# Patient Record
Sex: MALE | Race: BLACK OR AFRICAN AMERICAN | Employment: OTHER | URBAN - METROPOLITAN AREA
[De-identification: names, ages, dates, MRNs, and addresses within clinical notes are randomized per-mention and may not be internally consistent; named-entity substitution may affect disease eponyms.]

---

## 2023-06-27 ENCOUNTER — TELEPHONE (OUTPATIENT)
Dept: INTERNAL MEDICINE | Age: 88
End: 2023-06-27

## 2023-07-25 ENCOUNTER — OFFICE VISIT (OUTPATIENT)
Dept: INTERNAL MEDICINE | Age: 88
End: 2023-07-25
Payer: MEDICARE

## 2023-07-25 VITALS
DIASTOLIC BLOOD PRESSURE: 67 MMHG | SYSTOLIC BLOOD PRESSURE: 173 MMHG | OXYGEN SATURATION: 95 % | TEMPERATURE: 98.6 F | WEIGHT: 175.4 LBS | HEART RATE: 58 BPM

## 2023-07-25 DIAGNOSIS — R60.0 BILATERAL LEG EDEMA: Primary | ICD-10-CM

## 2023-07-25 DIAGNOSIS — I10 PRIMARY HYPERTENSION: ICD-10-CM

## 2023-07-25 PROBLEM — N40.0 BPH (BENIGN PROSTATIC HYPERPLASIA): Status: ACTIVE | Noted: 2023-07-25

## 2023-07-25 PROBLEM — I65.23 BILATERAL CAROTID ARTERY STENOSIS: Status: ACTIVE | Noted: 2023-07-25

## 2023-07-25 PROCEDURE — 99204 OFFICE O/P NEW MOD 45 MIN: CPT

## 2023-07-25 PROCEDURE — 4004F PT TOBACCO SCREEN RCVD TLK: CPT

## 2023-07-25 PROCEDURE — G8421 BMI NOT CALCULATED: HCPCS

## 2023-07-25 PROCEDURE — 1123F ACP DISCUSS/DSCN MKR DOCD: CPT

## 2023-07-25 PROCEDURE — G8428 CUR MEDS NOT DOCUMENT: HCPCS

## 2023-07-25 RX ORDER — FUROSEMIDE 40 MG/1
40 TABLET ORAL DAILY
Qty: 60 TABLET | Refills: 0 | Status: SHIPPED | OUTPATIENT
Start: 2023-07-25

## 2023-07-25 SDOH — ECONOMIC STABILITY: HOUSING INSECURITY
IN THE LAST 12 MONTHS, WAS THERE A TIME WHEN YOU DID NOT HAVE A STEADY PLACE TO SLEEP OR SLEPT IN A SHELTER (INCLUDING NOW)?: NO

## 2023-07-25 SDOH — ECONOMIC STABILITY: FOOD INSECURITY: WITHIN THE PAST 12 MONTHS, YOU WORRIED THAT YOUR FOOD WOULD RUN OUT BEFORE YOU GOT MONEY TO BUY MORE.: NEVER TRUE

## 2023-07-25 SDOH — ECONOMIC STABILITY: FOOD INSECURITY: WITHIN THE PAST 12 MONTHS, THE FOOD YOU BOUGHT JUST DIDN'T LAST AND YOU DIDN'T HAVE MONEY TO GET MORE.: NEVER TRUE

## 2023-07-25 SDOH — ECONOMIC STABILITY: INCOME INSECURITY: HOW HARD IS IT FOR YOU TO PAY FOR THE VERY BASICS LIKE FOOD, HOUSING, MEDICAL CARE, AND HEATING?: NOT HARD AT ALL

## 2023-07-25 ASSESSMENT — PATIENT HEALTH QUESTIONNAIRE - PHQ9
2. FEELING DOWN, DEPRESSED OR HOPELESS: 0
1. LITTLE INTEREST OR PLEASURE IN DOING THINGS: 0
SUM OF ALL RESPONSES TO PHQ QUESTIONS 1-9: 0
SUM OF ALL RESPONSES TO PHQ9 QUESTIONS 1 & 2: 0

## 2023-07-25 ASSESSMENT — ENCOUNTER SYMPTOMS
CONSTIPATION: 0
SHORTNESS OF BREATH: 0
NAUSEA: 0
ABDOMINAL PAIN: 0
VOMITING: 0

## 2023-07-25 NOTE — PROGRESS NOTES
[facility] PHYSICIANS  [facility]  [address]   [address] [address]  Dept: [phone]  Dept Fax: [phone]    Office Progress/Follow Up Note  Date ofpatient's visit: 7/25/2023  Patient's Name:  Judy Membreno YOB: 1932            Patient Care Team:  Chino Almodovar MD as PCP - General (Internal Medicine)  ================================================================    REASON FOR VISIT/CHIEF COMPLAINT:  FOLLOW-UP VISIT; No chief complaint on file. HISTORY OF PRESENTING ILLNESS:  History was obtained from: patient, electronic medical record. Rachel Catherine a 80 y.o. is here for a new patient to establish care. Past medical history of hypertension, BPH, glaucoma, b/l carotid endarterectomy and peripheral vascular disease    As per records he takes aspirin, hydrocholorothiazide, tamsulosin, losartan, hydralazine, hydralazine, metoprolol and furosemide. He reports that he only takes 2 blood pressure medications at home, 1 of which is hydrochlorothiazide 25 daily in the morning and he cannot remember the medication he takes for blood pressure. He also reports that he takes Lasix because of bilateral peripheral edema. He denies any acute complaints during the visit. He reports he is allergic to lisinopril    Echo done in 7324 shows Systolic function is normal with an ejection fraction of 60-65%. Aortic Valve: There is mild regurgitation. There is mild stenosis. No results found for: HGB, LABA1C, CHOL, HDL, LDLCHOLESTEROL, TRIG, VITD25, CREATININE      There is no problem list on file for this patient. Health Maintenance Due   Topic Date Due    Depression Screen  Never done    Shingles vaccine (1 of 2) Never done    Pneumococcal 65+ years Vaccine (1 - PCV) Never done    COVID-19 Vaccine (4 - Booster for Pfizer series) 06/08/2022       No Known Allergies      No current outpatient medications on file.      No current facility-administered

## 2023-07-25 NOTE — PROGRESS NOTES
Attending Physician Statement  I have discussed the care of Edgar Frost, including pertinent history and exam findings with the resident. I have reviewed the key elements of all parts of the encounter with the resident. I have seen and examined the patient with the resident and the key elements of all parts of the encounter have been performed by me. I agree with the assessment, and status of the problem list as documented and this was also documented by the resident. I discussed in details with him and his Son over phone. Advised his son to come  with his parents. They don't know what medication they take. Patient doesn't want to change his meds. Per son they need home health Aid but patient refuse. Diagnosis Orders   1. Bilateral leg edema  furosemide (LASIX) 40 MG tablet      2.  Primary hypertension             Benjie Dockery MD   Attending Physician, [address] [place] [address], Internal Medicine Residency Program  [address] [facility]

## 2023-07-28 ENCOUNTER — OFFICE VISIT (OUTPATIENT)
Dept: INTERNAL MEDICINE | Age: 88
End: 2023-07-28

## 2023-07-28 VITALS
TEMPERATURE: 97.7 F | WEIGHT: 175.4 LBS | OXYGEN SATURATION: 95 % | SYSTOLIC BLOOD PRESSURE: 164 MMHG | HEART RATE: 55 BPM | DIASTOLIC BLOOD PRESSURE: 79 MMHG

## 2023-07-28 DIAGNOSIS — N40.0 BENIGN PROSTATIC HYPERPLASIA WITHOUT LOWER URINARY TRACT SYMPTOMS: Primary | ICD-10-CM

## 2023-07-28 DIAGNOSIS — I10 PRIMARY HYPERTENSION: ICD-10-CM

## 2023-07-28 RX ORDER — TAMSULOSIN HYDROCHLORIDE 0.4 MG/1
0.4 CAPSULE ORAL DAILY
Qty: 30 CAPSULE | Refills: 1 | Status: SHIPPED | OUTPATIENT
Start: 2023-07-28

## 2023-07-28 RX ORDER — TAMSULOSIN HYDROCHLORIDE 0.4 MG/1
0.4 CAPSULE ORAL DAILY
COMMUNITY
End: 2023-07-28 | Stop reason: SDUPTHER

## 2023-07-28 RX ORDER — LOSARTAN POTASSIUM 100 MG/1
100 TABLET ORAL DAILY
COMMUNITY

## 2023-07-28 RX ORDER — LATANOPROST 50 UG/ML
1 SOLUTION/ DROPS OPHTHALMIC NIGHTLY
COMMUNITY

## 2023-07-28 RX ORDER — HYDRALAZINE HYDROCHLORIDE 25 MG/1
25 TABLET, FILM COATED ORAL 3 TIMES DAILY
COMMUNITY

## 2023-07-28 RX ORDER — TIMOLOL MALEATE 5 MG/ML
1 SOLUTION/ DROPS OPHTHALMIC 2 TIMES DAILY
COMMUNITY

## 2023-07-28 ASSESSMENT — ENCOUNTER SYMPTOMS
DIARRHEA: 0
APNEA: 0
ABDOMINAL PAIN: 0
NAUSEA: 0
SHORTNESS OF BREATH: 0
VOMITING: 0

## 2023-07-28 NOTE — PROGRESS NOTES
[facility] & [facility] PHYSICIANS  [facility]  [address]   [address] [address]  Dept: [phone]  Dept Fax: [phone]    Office Progress/Follow Up Note  Date ofpatient's visit: 7/28/2023  Patient's Name:  Jordyn Coleman YOB: 1932            Patient Care Team:  Dilcia Cortez MD as PCP - General (Internal Medicine)  ================================================================    REASON FOR VISIT/CHIEF COMPLAINT:  FOLLOW-UP VISIT; Follow-up (Pt has bag with all meds hes taking and meds records were faxed in as well)    HISTORY OF PRESENTING ILLNESS:  History was obtained from: patient, electronic medical record. David Yeager a 80 y.o. is here for a follow-up visit. Past medical history of hypertension, BPH, glaucoma, b/l carotid endarterectomy and peripheral vascular disease, hearing loss    He was instructed during his last visit to bring the medications he takes at home. He denies any complaints during the visit, he is accompanied by his son. Currently takes losartan 100 daily, hydralazine 25 daily, Flomax and Lasix 40 as needed    His lab work was requested from his previous medical provider, his last blood work was done in July 2022. No results found for: HGB, LABA1C, CHOL, HDL, LDLCHOLESTEROL, TRIG, VITD25, CREATININE      Patient Active Problem List   Diagnosis    Bilateral carotid artery stenosis    BPH (benign prostatic hyperplasia)       Health Maintenance Due   Topic Date Due    Pneumococcal 65+ years Vaccine (1 - PCV) Never done    Shingles vaccine (1 of 2) Never done    COVID-19 Vaccine (4 - Booster for Pfizer series) 06/08/2022    Annual Wellness Visit (AWV)  07/25/2023       Allergies   Allergen Reactions    Lisinopril Swelling         Current Outpatient Medications   Medication Sig Dispense Refill    furosemide (LASIX) 40 MG tablet Take 1 tablet by mouth daily 60 tablet 0     No current facility-administered medications for this visit.

## 2023-07-28 NOTE — PROGRESS NOTES
Attending Physician Statement  I have discussed the care of Eldora Severs, including pertinent history and exam findings with the resident. I have reviewed the key elements of all parts of the encounter with the resident. I have seen and examined the patient with the resident and the key elements of all parts of the encounter have been performed by me. I agree with the assessment, and status of the problem list as documented. I discussed with him , he doesn't want to add any meds for elevated BP. The plan and orders should include   Orders Placed This Encounter   Procedures    CBC with Auto Differential    Basic Metabolic Panel    and this was also documented by the resident. The medication list was reviewed with the resident and is up to date. The return visit should be in 3 months . Diagnosis Orders   1. Benign prostatic hyperplasia without lower urinary tract symptoms  tamsulosin (FLOMAX) 0.4 MG capsule      2.  Primary hypertension  CBC with Auto Differential    Basic Metabolic Panel           Bard Zachery MD   Attending Physician, [facility]   Faculty, Internal Medicine Residency Program  [address] [facility]

## 2023-07-31 ENCOUNTER — HOSPITAL ENCOUNTER (OUTPATIENT)
Age: 88
Setting detail: SPECIMEN
Discharge: HOME | End: 2023-07-31

## 2023-07-31 DIAGNOSIS — I10 PRIMARY HYPERTENSION: ICD-10-CM

## 2023-07-31 LAB
ANION GAP SERPL CALCULATED.3IONS-SCNC: 11 MMOL/L (ref 9–17)
BASOPHILS # BLD: 0.03 K/UL (ref 0–0.2)
BASOPHILS NFR BLD: 1 % (ref 0–2)
BUN SERPL-MCNC: 23 MG/DL (ref 8–23)
CALCIUM SERPL-MCNC: 8.7 MG/DL (ref 8.6–10.4)
CHLORIDE SERPL-SCNC: 107 MMOL/L (ref 98–107)
CO2 SERPL-SCNC: 25 MMOL/L (ref 20–31)
CREAT SERPL-MCNC: 1.3 MG/DL (ref 0.7–1.2)
EOSINOPHIL # BLD: 0.06 K/UL (ref 0–0.44)
EOSINOPHILS RELATIVE PERCENT: 1 % (ref 1–4)
ERYTHROCYTE [DISTWIDTH] IN BLOOD BY AUTOMATED COUNT: 14.6 % (ref 11.8–14.4)
GFR SERPL CREATININE-BSD FRML MDRD: 52 ML/MIN/1.73M2
GLUCOSE SERPL-MCNC: 109 MG/DL (ref 70–99)
HCT VFR BLD AUTO: 37.5 % (ref 40.7–50.3)
HGB BLD-MCNC: 11.9 G/DL (ref 13–17)
IMM GRANULOCYTES # BLD AUTO: <0.03 K/UL (ref 0–0.3)
IMM GRANULOCYTES NFR BLD: 0 %
LYMPHOCYTES NFR BLD: 1.88 K/UL (ref 1.1–3.7)
LYMPHOCYTES RELATIVE PERCENT: 35 % (ref 24–43)
MCH RBC QN AUTO: 29.9 PG (ref 25.2–33.5)
MCHC RBC AUTO-ENTMCNC: 31.7 G/DL (ref 28.4–34.8)
MCV RBC AUTO: 94.2 FL (ref 82.6–102.9)
MONOCYTES NFR BLD: 0.4 K/UL (ref 0.1–1.2)
MONOCYTES NFR BLD: 8 % (ref 3–12)
NEUTROPHILS NFR BLD: 56 % (ref 36–65)
NEUTS SEG NFR BLD: 2.97 K/UL (ref 1.5–8.1)
NRBC BLD-RTO: 0 PER 100 WBC
PLATELET # BLD AUTO: ABNORMAL K/UL (ref 138–453)
PLATELET, FLUORESCENCE: 158 K/UL (ref 138–453)
PLATELETS.RETICULATED NFR BLD AUTO: 13.5 % (ref 1.1–10.3)
POTASSIUM SERPL-SCNC: 3.9 MMOL/L (ref 3.7–5.3)
RBC # BLD AUTO: 3.98 M/UL (ref 4.21–5.77)
RBC # BLD: ABNORMAL 10*6/UL
SODIUM SERPL-SCNC: 143 MMOL/L (ref 135–144)
WBC OTHER # BLD: 5.4 K/UL (ref 3.5–11.3)

## 2023-09-22 ENCOUNTER — OFFICE VISIT (OUTPATIENT)
Dept: INTERNAL MEDICINE | Age: 88
End: 2023-09-22
Payer: MEDICARE

## 2023-09-22 VITALS
WEIGHT: 153 LBS | SYSTOLIC BLOOD PRESSURE: 181 MMHG | DIASTOLIC BLOOD PRESSURE: 84 MMHG | BODY MASS INDEX: 23.19 KG/M2 | HEART RATE: 59 BPM | HEIGHT: 68 IN | OXYGEN SATURATION: 98 % | TEMPERATURE: 97.9 F

## 2023-09-22 DIAGNOSIS — N18.31 STAGE 3A CHRONIC KIDNEY DISEASE (HCC): ICD-10-CM

## 2023-09-22 DIAGNOSIS — I35.1 MILD AORTIC REGURGITATION: ICD-10-CM

## 2023-09-22 DIAGNOSIS — D64.9 NORMOCYTIC ANEMIA: ICD-10-CM

## 2023-09-22 DIAGNOSIS — N40.0 BENIGN PROSTATIC HYPERPLASIA WITHOUT LOWER URINARY TRACT SYMPTOMS: ICD-10-CM

## 2023-09-22 DIAGNOSIS — I73.9 PERIPHERAL ARTERY DISEASE (HCC): ICD-10-CM

## 2023-09-22 DIAGNOSIS — R60.0 BILATERAL LEG EDEMA: ICD-10-CM

## 2023-09-22 DIAGNOSIS — I10 PRIMARY HYPERTENSION: Primary | ICD-10-CM

## 2023-09-22 DIAGNOSIS — I35.0 MILD AORTIC STENOSIS: ICD-10-CM

## 2023-09-22 PROBLEM — H40.1190 PRIMARY OPEN ANGLE GLAUCOMA (POAG): Status: ACTIVE | Noted: 2023-09-22

## 2023-09-22 PROBLEM — I70.1 RENAL ARTERY STENOSIS (HCC): Status: ACTIVE | Noted: 2023-09-22

## 2023-09-22 PROCEDURE — G8427 DOCREV CUR MEDS BY ELIG CLIN: HCPCS

## 2023-09-22 PROCEDURE — 4004F PT TOBACCO SCREEN RCVD TLK: CPT

## 2023-09-22 PROCEDURE — G8420 CALC BMI NORM PARAMETERS: HCPCS

## 2023-09-22 PROCEDURE — 99214 OFFICE O/P EST MOD 30 MIN: CPT

## 2023-09-22 PROCEDURE — 99211 OFF/OP EST MAY X REQ PHY/QHP: CPT | Performed by: STUDENT IN AN ORGANIZED HEALTH CARE EDUCATION/TRAINING PROGRAM

## 2023-09-22 PROCEDURE — 1123F ACP DISCUSS/DSCN MKR DOCD: CPT

## 2023-09-22 RX ORDER — ASPIRIN 81 MG/1
81 TABLET ORAL DAILY
COMMUNITY

## 2023-09-22 RX ORDER — LOSARTAN POTASSIUM 100 MG/1
100 TABLET ORAL DAILY
Qty: 30 TABLET | Refills: 0 | Status: SHIPPED | OUTPATIENT
Start: 2023-09-22

## 2023-09-22 RX ORDER — FUROSEMIDE 40 MG/1
40 TABLET ORAL DAILY
Qty: 30 TABLET | Refills: 0 | Status: SHIPPED | OUTPATIENT
Start: 2023-09-22

## 2023-09-22 RX ORDER — HYDRALAZINE HYDROCHLORIDE 25 MG/1
25 TABLET, FILM COATED ORAL
Qty: 90 TABLET | Refills: 0 | Status: CANCELLED | OUTPATIENT
Start: 2023-09-22

## 2023-09-22 RX ORDER — TAMSULOSIN HYDROCHLORIDE 0.4 MG/1
0.4 CAPSULE ORAL DAILY
Qty: 30 CAPSULE | Refills: 0 | Status: SHIPPED | OUTPATIENT
Start: 2023-09-22

## 2023-09-22 RX ORDER — HYDRALAZINE HYDROCHLORIDE 25 MG/1
50 TABLET, FILM COATED ORAL
COMMUNITY
Start: 2019-11-18 | End: 2023-09-22

## 2023-09-22 RX ORDER — LOSARTAN POTASSIUM 100 MG/1
100 TABLET ORAL DAILY
COMMUNITY
Start: 2017-12-22 | End: 2023-09-22

## 2023-09-22 ASSESSMENT — ENCOUNTER SYMPTOMS
CONSTIPATION: 0
ABDOMINAL PAIN: 0
NAUSEA: 0
SHORTNESS OF BREATH: 0
DIARRHEA: 0
VOMITING: 0

## 2023-09-22 ASSESSMENT — PATIENT HEALTH QUESTIONNAIRE - PHQ9
SUM OF ALL RESPONSES TO PHQ QUESTIONS 1-9: 0
2. FEELING DOWN, DEPRESSED OR HOPELESS: 0
SUM OF ALL RESPONSES TO PHQ QUESTIONS 1-9: 0
SUM OF ALL RESPONSES TO PHQ9 QUESTIONS 1 & 2: 0
1. LITTLE INTEREST OR PLEASURE IN DOING THINGS: 0
SUM OF ALL RESPONSES TO PHQ QUESTIONS 1-9: 0
SUM OF ALL RESPONSES TO PHQ QUESTIONS 1-9: 0

## 2023-09-22 NOTE — PROGRESS NOTES
[facility] PHYSICIANS  [facility]  [address]   [address] [address]  Dept: [phone]  Dept Fax: [phone]    Office Progress/Follow Up Note  Date ofpatient's visit: 9/22/2023  Patient's Name:  Raheem Arora YOB: 1932            Patient Care Team:  Dolores Carey MD as PCP - General (Internal Medicine)  ================================================================    REASON FOR VISIT/CHIEF COMPLAINT:  FOLLOW-UP VISIT; Discuss Labs    HISTORY OF PRESENTING ILLNESS:  History was obtained from: patient, electronic medical record. Henrique hayes 80 y.o. is here for a follow-up visit. Past medical history significant for hypertension, stage III CKD, peripheral artery disease, mild aortic regurgitation with aortic stenosis, BPH, glaucoma    Today in clinic patient blood pressure is 181/84, his blood pressure readings at home are also been elevated. He did mention that he was taking his medication as prescribed but when checking his dispense report it seems like he has not filled his blood pressure medications for the past 1 year. He then mentioned that he has not been taking his blood pressure medications for the past few months. He denies any chest pain, shortness of breath, orthopnea during the visit. He does complain of bilateral leg swelling and he takes Lasix 40 daily which helps with the swelling    Echo in 2019 shows EF is 60-65%.      Lab Results   Component Value Date    HGB 11.9 (L) 07/31/2023    CREATININE 1.3 (H) 07/31/2023         Patient Active Problem List   Diagnosis    Bilateral carotid artery stenosis    BPH (benign prostatic hyperplasia)    Primary hypertension    Primary open angle glaucoma (POAG)       Health Maintenance Due   Topic Date Due    Pneumococcal 65+ years Vaccine (1 - PCV) Never done    Shingles vaccine (1 of 2) Never done    COVID-19 Vaccine (4 - Pfizer series) 06/08/2022    Annual Wellness Visit (AWV)  Never done    Flu

## 2023-10-13 ENCOUNTER — HOSPITAL ENCOUNTER (OUTPATIENT)
Age: 88
Setting detail: SPECIMEN
Discharge: HOME | End: 2023-10-13

## 2023-10-13 ENCOUNTER — OFFICE VISIT (OUTPATIENT)
Dept: INTERNAL MEDICINE | Age: 88
End: 2023-10-13
Payer: MEDICARE

## 2023-10-13 VITALS
DIASTOLIC BLOOD PRESSURE: 70 MMHG | HEART RATE: 62 BPM | BODY MASS INDEX: 23.1 KG/M2 | OXYGEN SATURATION: 99 % | SYSTOLIC BLOOD PRESSURE: 161 MMHG | TEMPERATURE: 99.1 F | WEIGHT: 152.4 LBS | HEIGHT: 68 IN

## 2023-10-13 DIAGNOSIS — I65.23 BILATERAL CAROTID ARTERY STENOSIS: ICD-10-CM

## 2023-10-13 DIAGNOSIS — I70.1 RENAL ARTERY STENOSIS (HCC): ICD-10-CM

## 2023-10-13 DIAGNOSIS — I10 UNCONTROLLED HYPERTENSION: Primary | ICD-10-CM

## 2023-10-13 DIAGNOSIS — I10 UNCONTROLLED HYPERTENSION: ICD-10-CM

## 2023-10-13 LAB
ANION GAP SERPL CALCULATED.3IONS-SCNC: 6 MMOL/L (ref 9–16)
BUN SERPL-MCNC: 16 MG/DL (ref 8–23)
CALCIUM SERPL-MCNC: 8.9 MG/DL (ref 8.6–10.4)
CHLORIDE SERPL-SCNC: 102 MMOL/L (ref 98–107)
CO2 SERPL-SCNC: 30 MMOL/L (ref 20–31)
CREAT SERPL-MCNC: 1.2 MG/DL (ref 0.7–1.2)
GFR SERPL CREATININE-BSD FRML MDRD: 59 ML/MIN/1.73M2
GLUCOSE SERPL-MCNC: 96 MG/DL (ref 74–99)
POTASSIUM SERPL-SCNC: 4.2 MMOL/L (ref 3.7–5.3)
SODIUM SERPL-SCNC: 138 MMOL/L (ref 136–145)

## 2023-10-13 PROCEDURE — G8420 CALC BMI NORM PARAMETERS: HCPCS

## 2023-10-13 PROCEDURE — 4004F PT TOBACCO SCREEN RCVD TLK: CPT

## 2023-10-13 PROCEDURE — 1123F ACP DISCUSS/DSCN MKR DOCD: CPT

## 2023-10-13 PROCEDURE — G8484 FLU IMMUNIZE NO ADMIN: HCPCS

## 2023-10-13 PROCEDURE — G8427 DOCREV CUR MEDS BY ELIG CLIN: HCPCS

## 2023-10-13 PROCEDURE — 99213 OFFICE O/P EST LOW 20 MIN: CPT

## 2023-10-13 RX ORDER — AMLODIPINE BESYLATE 5 MG/1
5 TABLET ORAL DAILY
Qty: 30 TABLET | Refills: 5 | Status: SHIPPED | OUTPATIENT
Start: 2023-10-13

## 2023-10-13 ASSESSMENT — ENCOUNTER SYMPTOMS
COUGH: 0
ABDOMINAL PAIN: 0
DIARRHEA: 0
SHORTNESS OF BREATH: 0
COLOR CHANGE: 0
NAUSEA: 0

## 2023-10-13 NOTE — PROGRESS NOTES
[facility] & [facility] PHYSICIANS  [facility]  [address]   [address] [address]  Dept: [phone]  Dept Fax: [phone]    Office Progress/Follow Up Note  Date ofpatient's visit: 10/13/2023  Patient's Name:  Kathy Hardy. YOB: 1932            Patient Care Team:  Edna Shetty MD as PCP - General (Internal Medicine)  ================================================================    REASON FOR VISIT/CHIEF COMPLAINT:  FOLLOW-UP VISIT; Hypertension (Follow up )    HISTORY OF PRESENTING ILLNESS:  History was obtained from: patient, electronic medical record. Vinay Riggins is a 80 y.o. is here for a follow-up visit for hypertension. Past medical history significant for hypertension, stage III CKD, peripheral artery disease, mild aortic regurgitation with aortic stenosis, BPH, glaucoma. His blood pressure today is 161/70, he also reports that his blood pressure at home has been between 160 and 170. He denies any acute complaints during the visit. Currently on losartan 100 for his blood pressure and he also takes Lasix 40 for his pedal edema. Has follow-up scheduled with vascular surgery for his carotid artery stenosis and renal artery stenosis, he is currently on aspirin as per vascular surgery. He has scheduled echocardiogram on October 27, last echo in 2019 showed an EF of 60 to 65%.       Lab Results   Component Value Date    HGB 11.9 (L) 07/31/2023    CREATININE 1.3 (H) 07/31/2023         Patient Active Problem List   Diagnosis    Bilateral carotid artery stenosis    BPH (benign prostatic hyperplasia)    Primary hypertension    Primary open angle glaucoma (POAG)    Peripheral artery disease (HCC)    Renal artery stenosis (HCC)    Stage 3a chronic kidney disease (HCC)    Normocytic anemia    Mild aortic regurgitation    Mild aortic stenosis       Health Maintenance Due   Topic Date Due    COVID-19 Vaccine (4 - Pfizer series) 06/08/2022    Annual Wellness

## 2023-10-13 NOTE — PROGRESS NOTES
Attending Physician Statement  I have discussed the care of Shahram Robles,  including pertinent history and exam findings,  with the resident. I have seen and examined the patient and the key elements of all parts of the encounter have been performed by me. I agree with the assessment, plan and orders as documented by the resident.   (GC Modifier)    Uncontrolled hypertension in the setting of b/l renal artery stenosis and carotid artery stenosis, was started on losartan last visit :   Bmp today and add low dose amlodipine for optimize bp     MD ARTURO Benitez  Attending Physician, [address] [place] [address], Internal Medicine Residency Program  [address] [facility]  10/13/2023, 1:14 PM

## 2023-10-20 DIAGNOSIS — I10 PRIMARY HYPERTENSION: ICD-10-CM

## 2023-10-20 DIAGNOSIS — R60.0 BILATERAL LEG EDEMA: ICD-10-CM

## 2023-10-20 DIAGNOSIS — N40.0 BENIGN PROSTATIC HYPERPLASIA WITHOUT LOWER URINARY TRACT SYMPTOMS: ICD-10-CM

## 2023-10-20 RX ORDER — TAMSULOSIN HYDROCHLORIDE 0.4 MG/1
0.4 CAPSULE ORAL
Qty: 30 CAPSULE | Refills: 0 | Status: SHIPPED | OUTPATIENT
Start: 2023-10-20

## 2023-10-20 RX ORDER — FUROSEMIDE 40 MG/1
40 TABLET ORAL DAILY
Qty: 30 TABLET | Refills: 0 | Status: SHIPPED | OUTPATIENT
Start: 2023-10-20

## 2023-10-20 NOTE — TELEPHONE ENCOUNTER
.. Request for   Requested Prescriptions     Pending Prescriptions Disp Refills    furosemide (LASIX) 40 MG tablet [Pharmacy Med Name: FUROSEMIDE 40 MG TABLET] 30 tablet 0     Sig: take 1 tablet by mouth once daily    tamsulosin (FLOMAX) 0.4 MG capsule [Pharmacy Med Name: TAMSULOSIN HCL 0.4 MG CAPSULE] 30 capsule 0     Sig: take 1 capsule by mouth at bedtime    . Please review and e-scribe to pharmacy listed in chart if appropriate. Thank you.       Last Visit Date: 10/13/2023  Next Visit Date: 10/27/2023    Future Appointments   Date Time Provider [address]   10/27/2023  7:00 AM STV ECHO 1 STVZ OPAL STV Radiolog   10/27/2023  9:00 AM Adarsh Fernandes MD [facility] [address] Maintenance   Topic Date Due    COVID-19 Vaccine (4 - Pfizer series) 06/08/2022    Annual Wellness Visit (AWV)  Never done    Pneumococcal 65+ years Vaccine (1 - PCV) 08/30/2024 (Originally 10/4/1938)    Flu vaccine (1) 09/22/2024 (Originally 8/1/2023)    Shingles vaccine (1 of 2) 09/22/2024 (Originally 10/4/1982)    Depression Screen  09/22/2024    DTaP/Tdap/Td vaccine (4 - Td or Tdap) 01/30/2033    Hepatitis A vaccine  Aged Out    Hepatitis B vaccine  Aged Out    Hib vaccine  Aged Out    Meningococcal (ACWY) vaccine  Aged Out       No results found for: \"LABA1C\"          ( goal A1C is < 7)   No components found for: \"LABMICR\"  No results found for: \"LDLCHOLESTEROL\", \"LDLCALC\"    (goal LDL is <100)   BUN (mg/dL)   Date Value   10/13/2023 16     BP Readings from Last 3 Encounters:   10/13/23 (!) 161/70   09/22/23 (!) 181/84   07/28/23 (!) 164/79          (goal 120/80)    All Future Testing planned in [facility]  Lab Frequency Next Occurrence   Echo (TTE) complete with contrast, bubble, strain, and 3D PRN Once 09/22/2023         Patient Active Problem List:     Bilateral carotid artery stenosis     BPH (benign prostatic hyperplasia)     Primary hypertension     Primary open angle glaucoma (POAG)     Peripheral artery disease

## 2023-10-27 ENCOUNTER — HOSPITAL ENCOUNTER (OUTPATIENT)
Age: 88
End: 2023-10-27
Attending: STUDENT IN AN ORGANIZED HEALTH CARE EDUCATION/TRAINING PROGRAM
Payer: MEDICARE

## 2023-10-27 DIAGNOSIS — I35.0 MILD AORTIC STENOSIS: ICD-10-CM

## 2023-10-27 DIAGNOSIS — I35.1 MILD AORTIC REGURGITATION: ICD-10-CM

## 2023-10-27 PROCEDURE — 93306 TTE W/DOPPLER COMPLETE: CPT

## 2023-10-27 PROCEDURE — 93306 TTE W/DOPPLER COMPLETE: CPT | Performed by: INTERNAL MEDICINE

## 2023-10-28 LAB
ECHO AO ASC DIAM: 2.9 CM
ECHO AO ROOT DIAM: 2.5 CM
ECHO AV AREA PEAK VELOCITY: 0.8 CM2
ECHO AV AREA VTI: 0.8 CM2
ECHO AV MEAN GRADIENT: 25 MMHG
ECHO AV MEAN VELOCITY: 2.3 M/S
ECHO AV PEAK GRADIENT: 43 MMHG
ECHO AV PEAK VELOCITY: 3.3 M/S
ECHO AV VELOCITY RATIO: 0.27
ECHO AV VTI: 92 CM
ECHO LA AREA 2C: 17.8 CM2
ECHO LA AREA 4C: 18.8 CM2
ECHO LA DIAMETER: 3.8 CM
ECHO LA MAJOR AXIS: 5.5 CM
ECHO LA MINOR AXIS: 5.3 CM
ECHO LA TO AORTIC ROOT RATIO: 1.52
ECHO LA VOL 2C: 51 ML (ref 18–58)
ECHO LA VOL 4C: 52 ML (ref 18–58)
ECHO LA VOL BP: 52 ML (ref 18–58)
ECHO LV E' LATERAL VELOCITY: 6 CM/S
ECHO LV E' SEPTAL VELOCITY: 6 CM/S
ECHO LV EDV A2C: 80 ML
ECHO LV EDV A4C: 106 ML
ECHO LV EJECTION FRACTION A2C: 48 %
ECHO LV EJECTION FRACTION A4C: 41 %
ECHO LV EJECTION FRACTION BIPLANE: 45 % (ref 55–100)
ECHO LV ESV A2C: 41 ML
ECHO LV ESV A4C: 63 ML
ECHO LV FRACTIONAL SHORTENING: 20 % (ref 28–44)
ECHO LV INTERNAL DIMENSION DIASTOLIC: 5 CM (ref 4.2–5.9)
ECHO LV INTERNAL DIMENSION SYSTOLIC: 4 CM
ECHO LV IVSD: 1.6 CM (ref 0.6–1)
ECHO LV MASS 2D: 276.4 G (ref 88–224)
ECHO LV POSTERIOR WALL DIASTOLIC: 1.1 CM (ref 0.6–1)
ECHO LV RELATIVE WALL THICKNESS RATIO: 0.44
ECHO LVOT AREA: 2.8 CM2
ECHO LVOT AV VTI INDEX: 0.29
ECHO LVOT DIAM: 1.9 CM
ECHO LVOT MEAN GRADIENT: 2 MMHG
ECHO LVOT PEAK GRADIENT: 3 MMHG
ECHO LVOT PEAK VELOCITY: 0.9 M/S
ECHO LVOT SV: 76.8 ML
ECHO LVOT VTI: 27.1 CM
ECHO MV A VELOCITY: 0.66 M/S
ECHO MV AREA VTI: 1.7 CM2
ECHO MV E DECELERATION TIME (DT): 224 MS
ECHO MV E VELOCITY: 0.95 M/S
ECHO MV E/A RATIO: 1.44
ECHO MV E/E' LATERAL: 15.83
ECHO MV E/E' RATIO (AVERAGED): 15.83
ECHO MV E/E' SEPTAL: 15.83
ECHO MV LVOT VTI INDEX: 1.63
ECHO MV MAX VELOCITY: 1.1 M/S
ECHO MV MEAN GRADIENT: 2 MMHG
ECHO MV MEAN VELOCITY: 0.6 M/S
ECHO MV PEAK GRADIENT: 4 MMHG
ECHO MV VTI: 44.1 CM
ECHO RV FREE WALL PEAK S': 9 CM/S
ECHO RV TAPSE: 1.6 CM (ref 1.7–?)
ECHO TV REGURGITANT MAX VELOCITY: 3.49 M/S
ECHO TV REGURGITANT PEAK GRADIENT: 49 MMHG

## 2023-10-30 DIAGNOSIS — I35.0 MILD AORTIC STENOSIS: Primary | ICD-10-CM

## 2023-10-30 NOTE — RESULT ENCOUNTER NOTE
Please inform patient that he needs to follow-up with cardiology regarding his echocardiogram. I have ordered an urgent referral to cardiology. Talked to the patient regarding his echo results and need for follow-up. Also notified patient if he develops any chest pain, shortness of breath or fatigue to go to the ED.

## 2023-11-02 ENCOUNTER — TELEPHONE (OUTPATIENT)
Dept: INTERNAL MEDICINE | Age: 88
End: 2023-11-02

## 2023-11-02 NOTE — TELEPHONE ENCOUNTER
----- Message from Gena Wills sent at 10/31/2023  8:49 AM EDT -----  Subject: Message to Provider    QUESTIONS  Information for Provider? Patient needs to speak to the office. Please   call back. ---------------------------------------------------------------------------  --------------  Steve Casey [facility]  [phone]; OK to leave message on voicemail  ---------------------------------------------------------------------------  --------------  SCRIPT ANSWERS  Relationship to Patient?  Self details… detailed exam

## 2023-11-17 DIAGNOSIS — R60.0 BILATERAL LEG EDEMA: ICD-10-CM

## 2023-11-17 DIAGNOSIS — N40.0 BENIGN PROSTATIC HYPERPLASIA WITHOUT LOWER URINARY TRACT SYMPTOMS: ICD-10-CM

## 2023-11-17 DIAGNOSIS — I10 PRIMARY HYPERTENSION: ICD-10-CM

## 2023-11-17 NOTE — TELEPHONE ENCOUNTER
Maria Elena Mcguire is calling to request a refill on the following medication(s):    Medication Request:  Requested Prescriptions     Pending Prescriptions Disp Refills    furosemide (LASIX) 40 MG tablet [Pharmacy Med Name: FUROSEMIDE 40 MG TABLET] 30 tablet 0     Sig: take 1 tablet by mouth once daily    tamsulosin (FLOMAX) 0.4 MG capsule [Pharmacy Med Name: TAMSULOSIN HCL 0.4 MG CAPSULE] 30 capsule 0     Sig: take 1 capsule by mouth at bedtime       Last Visit Date (If Applicable):  18/59/9589    Next Visit Date:    Visit date not found

## 2023-11-19 RX ORDER — TAMSULOSIN HYDROCHLORIDE 0.4 MG/1
0.4 CAPSULE ORAL
Qty: 30 CAPSULE | Refills: 2 | Status: SHIPPED | OUTPATIENT
Start: 2023-11-19

## 2023-11-19 RX ORDER — FUROSEMIDE 40 MG/1
40 TABLET ORAL DAILY
Qty: 30 TABLET | Refills: 2 | Status: SHIPPED | OUTPATIENT
Start: 2023-11-19

## 2024-01-18 ENCOUNTER — HOSPITAL ENCOUNTER (EMERGENCY)
Age: 89
Discharge: LEFT BEFORE DISCHARGE | End: 2024-01-19
Attending: EMERGENCY MEDICINE
Payer: MEDICARE

## 2024-01-18 DIAGNOSIS — I50.9 ACUTE CONGESTIVE HEART FAILURE, UNSPECIFIED HEART FAILURE TYPE (HCC): ICD-10-CM

## 2024-01-18 DIAGNOSIS — Z53.29 LEFT AGAINST MEDICAL ADVICE: ICD-10-CM

## 2024-01-18 DIAGNOSIS — R31.9 HEMATURIA, UNSPECIFIED TYPE: Primary | ICD-10-CM

## 2024-01-18 LAB
BASOPHILS # BLD: 0.03 K/UL (ref 0–0.2)
BASOPHILS NFR BLD: 0 % (ref 0–2)
EOSINOPHIL # BLD: <0.03 K/UL (ref 0–0.44)
EOSINOPHILS RELATIVE PERCENT: 0 % (ref 1–4)
ERYTHROCYTE [DISTWIDTH] IN BLOOD BY AUTOMATED COUNT: 15 % (ref 11.8–14.4)
HCT VFR BLD AUTO: 32.7 % (ref 40.7–50.3)
HGB BLD-MCNC: 10.4 G/DL (ref 13–17)
IMM GRANULOCYTES # BLD AUTO: 0.04 K/UL (ref 0–0.3)
IMM GRANULOCYTES NFR BLD: 0 %
LYMPHOCYTES NFR BLD: 0.96 K/UL (ref 1.1–3.7)
LYMPHOCYTES RELATIVE PERCENT: 9 % (ref 24–43)
MCH RBC QN AUTO: 29.8 PG (ref 25.2–33.5)
MCHC RBC AUTO-ENTMCNC: 31.8 G/DL (ref 28.4–34.8)
MCV RBC AUTO: 93.7 FL (ref 82.6–102.9)
MONOCYTES NFR BLD: 0.51 K/UL (ref 0.1–1.2)
MONOCYTES NFR BLD: 5 % (ref 3–12)
NEUTROPHILS NFR BLD: 86 % (ref 36–65)
NEUTS SEG NFR BLD: 9.25 K/UL (ref 1.5–8.1)
NRBC BLD-RTO: 0 PER 100 WBC
PLATELET # BLD AUTO: 175 K/UL (ref 138–453)
PMV BLD AUTO: 12.4 FL (ref 8.1–13.5)
RBC # BLD AUTO: 3.49 M/UL (ref 4.21–5.77)
RBC # BLD: ABNORMAL 10*6/UL
WBC OTHER # BLD: 10.8 K/UL (ref 3.5–11.3)

## 2024-01-18 PROCEDURE — 85610 PROTHROMBIN TIME: CPT

## 2024-01-18 PROCEDURE — 83880 ASSAY OF NATRIURETIC PEPTIDE: CPT

## 2024-01-18 PROCEDURE — 80053 COMPREHEN METABOLIC PANEL: CPT

## 2024-01-18 PROCEDURE — 85730 THROMBOPLASTIN TIME PARTIAL: CPT

## 2024-01-18 PROCEDURE — 99285 EMERGENCY DEPT VISIT HI MDM: CPT

## 2024-01-18 PROCEDURE — 84484 ASSAY OF TROPONIN QUANT: CPT

## 2024-01-18 PROCEDURE — 96374 THER/PROPH/DIAG INJ IV PUSH: CPT

## 2024-01-18 PROCEDURE — 85025 COMPLETE CBC W/AUTO DIFF WBC: CPT

## 2024-01-18 RX ORDER — METOPROLOL SUCCINATE 50 MG/1
50 TABLET, EXTENDED RELEASE ORAL DAILY
COMMUNITY
Start: 2017-08-23

## 2024-01-18 RX ORDER — NIFEDIPINE 90 MG/1
90 TABLET, EXTENDED RELEASE ORAL DAILY
COMMUNITY
Start: 2019-11-27

## 2024-01-18 RX ORDER — METOPROLOL SUCCINATE 25 MG/1
TABLET, EXTENDED RELEASE ORAL
COMMUNITY

## 2024-01-18 RX ORDER — HYDRALAZINE HYDROCHLORIDE 25 MG/1
TABLET, FILM COATED ORAL
COMMUNITY
Start: 2019-11-18

## 2024-01-18 RX ORDER — HYDROCHLOROTHIAZIDE 25 MG/1
25 TABLET ORAL DAILY
COMMUNITY
Start: 2017-12-22

## 2024-01-18 RX ORDER — TAMSULOSIN HYDROCHLORIDE 0.4 MG/1
0.4 CAPSULE ORAL
COMMUNITY
Start: 2017-12-22

## 2024-01-18 ASSESSMENT — PAIN - FUNCTIONAL ASSESSMENT: PAIN_FUNCTIONAL_ASSESSMENT: NONE - DENIES PAIN

## 2024-01-19 ENCOUNTER — TELEPHONE (OUTPATIENT)
Dept: INTERNAL MEDICINE | Age: 89
End: 2024-01-19

## 2024-01-19 ENCOUNTER — APPOINTMENT (OUTPATIENT)
Dept: GENERAL RADIOLOGY | Age: 89
End: 2024-01-19
Payer: MEDICARE

## 2024-01-19 VITALS
RESPIRATION RATE: 18 BRPM | TEMPERATURE: 98.3 F | DIASTOLIC BLOOD PRESSURE: 77 MMHG | HEIGHT: 68 IN | BODY MASS INDEX: 25.01 KG/M2 | HEART RATE: 82 BPM | OXYGEN SATURATION: 94 % | WEIGHT: 165 LBS | SYSTOLIC BLOOD PRESSURE: 162 MMHG

## 2024-01-19 LAB
ALBUMIN SERPL-MCNC: 3.9 G/DL (ref 3.5–5.2)
ALBUMIN/GLOB SERPL: 1.5 {RATIO} (ref 1–2.5)
ALP SERPL-CCNC: 80 U/L (ref 40–129)
ALT SERPL-CCNC: 16 U/L (ref 5–41)
ANION GAP SERPL CALCULATED.3IONS-SCNC: 14 MMOL/L (ref 9–17)
AST SERPL-CCNC: 23 U/L
BILIRUB SERPL-MCNC: 0.6 MG/DL (ref 0.3–1.2)
BNP SERPL-MCNC: ABNORMAL PG/ML
BUN SERPL-MCNC: 24 MG/DL (ref 8–23)
CALCIUM SERPL-MCNC: 8.5 MG/DL (ref 8.6–10.4)
CHLORIDE SERPL-SCNC: 100 MMOL/L (ref 98–107)
CO2 SERPL-SCNC: 22 MMOL/L (ref 20–31)
CREAT SERPL-MCNC: 1.3 MG/DL (ref 0.7–1.2)
GFR SERPL CREATININE-BSD FRML MDRD: 52 ML/MIN/1.73M2
GLUCOSE SERPL-MCNC: 154 MG/DL (ref 70–99)
INR PPP: 1.2
PARTIAL THROMBOPLASTIN TIME: 29 SEC (ref 23–36.5)
POTASSIUM SERPL-SCNC: 4 MMOL/L (ref 3.7–5.3)
PROT SERPL-MCNC: 6.5 G/DL (ref 6.4–8.3)
PROTHROMBIN TIME: 15.3 SEC (ref 11.7–14.9)
SARS-COV-2 RDRP RESP QL NAA+PROBE: NOT DETECTED
SODIUM SERPL-SCNC: 136 MMOL/L (ref 135–144)
SPECIMEN DESCRIPTION: NORMAL
TROPONIN I SERPL HS-MCNC: 79 NG/L (ref 0–22)
TROPONIN I SERPL HS-MCNC: 93 NG/L (ref 0–22)

## 2024-01-19 PROCEDURE — 71045 X-RAY EXAM CHEST 1 VIEW: CPT

## 2024-01-19 PROCEDURE — 6360000002 HC RX W HCPCS

## 2024-01-19 PROCEDURE — 87635 SARS-COV-2 COVID-19 AMP PRB: CPT

## 2024-01-19 PROCEDURE — 93005 ELECTROCARDIOGRAM TRACING: CPT

## 2024-01-19 PROCEDURE — 84484 ASSAY OF TROPONIN QUANT: CPT

## 2024-01-19 RX ORDER — LIDOCAINE HYDROCHLORIDE 20 MG/ML
JELLY TOPICAL PRN
Status: DISCONTINUED | OUTPATIENT
Start: 2024-01-19 | End: 2024-01-19

## 2024-01-19 RX ORDER — HYDRALAZINE HYDROCHLORIDE 20 MG/ML
10 INJECTION INTRAMUSCULAR; INTRAVENOUS ONCE
Status: COMPLETED | OUTPATIENT
Start: 2024-01-19 | End: 2024-01-19

## 2024-01-19 RX ORDER — LIDOCAINE HYDROCHLORIDE 20 MG/ML
JELLY TOPICAL PRN
Status: DISCONTINUED | OUTPATIENT
Start: 2024-01-19 | End: 2024-01-19 | Stop reason: HOSPADM

## 2024-01-19 RX ADMIN — HYDRALAZINE HYDROCHLORIDE 10 MG: 20 INJECTION, SOLUTION INTRAMUSCULAR; INTRAVENOUS at 00:14

## 2024-01-19 ASSESSMENT — ENCOUNTER SYMPTOMS
COUGH: 0
VOMITING: 0
DIARRHEA: 0
BACK PAIN: 0
NAUSEA: 0
ABDOMINAL PAIN: 0
SHORTNESS OF BREATH: 0

## 2024-01-19 NOTE — ED PROVIDER NOTES
[facility] ED  Emergency Department  Emergency Medicine Resident Sign-out     Care of Shukri Stephenson Sr. was assumed from Dr. Rogers and is being seen for Urinary Retention   .  The patient's initial evaluation and plan have been discussed with the prior provider who initially evaluated the patient.     EMERGENCY DEPARTMENT COURSE / MEDICAL DECISION MAKING:       MEDICATIONS GIVEN:  Orders Placed This Encounter   Medications    hydrALAZINE (APRESOLINE) injection 10 mg    DISCONTD: lidocaine (XYLOCAINE) 2 % jelly    lidocaine (XYLOCAINE) 2 % uro-jet       LABS / RADIOLOGY:     Labs Reviewed   CBC WITH AUTO DIFFERENTIAL - Abnormal; Notable for the following components:       Result Value    RBC 3.49 (*)     Hemoglobin 10.4 (*)     Hematocrit 32.7 (*)     RDW 15.0 (*)     Neutrophils % 86 (*)     Lymphocytes % 9 (*)     Eosinophils % 0 (*)     Neutrophils Absolute 9.25 (*)     Lymphocytes Absolute 0.96 (*)     All other components within normal limits   PROTIME-INR - Abnormal; Notable for the following components:    Protime 15.3 (*)     All other components within normal limits   COMPREHENSIVE METABOLIC PANEL - Abnormal; Notable for the following components:    Glucose 154 (*)     BUN 24 (*)     Creatinine 1.3 (*)     Est, Glom Filt Rate 52 (*)     Calcium 8.5 (*)     All other components within normal limits   BRAIN NATRIURETIC PEPTIDE - Abnormal; Notable for the following components:    Pro-BNP 23,161 (*)     All other components within normal limits   TROPONIN - Abnormal; Notable for the following components:    Troponin, High Sensitivity 93 (*)     All other components within normal limits   TROPONIN - Abnormal; Notable for the following components:    Troponin, High Sensitivity 79 (*)     All other components within normal limits   COVID-19, RAPID   CULTURE, URINE   APTT   URINALYSIS WITH MICROSCOPIC       XR CHEST PORTABLE   Final Result   Cardiomediastinal silhouette is enlarged, new since

## 2024-01-19 NOTE — ED NOTES
The following labs were labeled with appropriate pt sticker and tubed to lab:     [x] Blue     [x] Lavender   [] on ice  [x] Green/yellow  [x] Green/black [] on ice  [] Grey  [] on ice  [] Yellow  [] Red  [] Pink  [] Type/ Screen  [] ABG  [] VBG    [] COVID-19 swab    [] Rapid  [] PCR  [] Flu swab  [] Peds Viral Panel     [] Urine Sample  [] Fecal Sample  [] Pelvic Cultures  [] Blood Cultures  [] X 2  [] STREP Cultures  [] Wound Cultures

## 2024-01-19 NOTE — ED PROVIDER NOTES
[facility]     Emergency Department     Faculty Attestation    I performed a history and physical examination of the patient and discussed management with the resident. I reviewed the resident’s note and agree with the documented findings and plan of care. Any areas of disagreement are noted on the chart. I was personally present for the key portions of any procedures. I have documented in the chart those procedures where I was not present during the key portions. I have reviewed the emergency nurses triage note. I agree with the chief complaint, past medical history, past surgical history, allergies, medications, social and family history as documented unless otherwise noted below. Documentation of the HPI, Physical Exam and Medical Decision Making performed by medical students or scribes is based on my personal performance of the HPI, PE and MDM. For Physician Assistant/ Nurse Practitioner cases/documentation I have personally evaluated this patient and have completed at least one if not all key elements of the E/M (history, physical exam, and MDM). Additional findings are as noted.    Vital signs:   Vitals:    01/18/24 2321   BP:    Pulse:    Resp:    Temp: 97.7 °F (36.5 °C)   SpO2:       89-year-old male presents complaining of urinary retention since 6 PM.  Patient also reports grossly bloody urine.  Prior history of similar urinary retention in the past.  No fever or chills.  History of BPH.  On physical exam, the patient is alert and afebrile.  Repeat temp was 37.7 °C.  His abdomen is soft, with obvious bladder distention and discomfort with palpation.  No rebound or guarding.  Bowel sounds normal.            Roro Leon M.D,  Attending Emergency  Physician           Roro Leon MD  01/18/24 7611

## 2024-01-19 NOTE — ED PROVIDER NOTES
Faculty Sign-Out Attestation  Handoff taken on the following patient from prior Attending Physician: Carolyn    I was available and discussed any additional care issues that arose and coordinated the management plans with the resident(s) caring for the patient during my duty period. Any areas of disagreement with resident’s documentation of care or procedures are noted on the chart. I was personally present for the key portions of any/all procedures during my duty period. I have documented in the chart those procedures where I was not present during the key portions.    Urinary retention,   Desaturating, bnp 93321,   Needing admit, placing meyer,   but pt would like to leave,     -normal sinus, hr 81, no ischemia, L axis, rbbb qtc 450     -pt leaving ama / pt appears to have decision making capacity, pt aware of risk of leaving,   -, pt wishes to go home to care for his wife     TenRubén ramires, DO  01/19/24 0353

## 2024-01-19 NOTE — TELEPHONE ENCOUNTER
Patient was in office with wife and when they left noticed a large amount of blood on the chair in the exam room and also in the waiting room.  Spoke to patient and he stated that the bleeding has stopped.  Patient was informed that he needs to go to the ER ASAP.  Patient stated that he will take in consideration what the writer is saying.  Writer also stated that he was to be seen today from the ER visit last night.  Patient stated that he was not at his office and didn't have is calendar and would call back when he can make an appt..  Patient hung up on writer.

## 2024-01-19 NOTE — ED PROVIDER NOTES
[facility] ED  Emergency Department Encounter  Emergency Medicine Resident     Pt Name:Shukri Stephenson Sr.  MRN: [id number]  Birthdate 10/4/1932  Date of evaluation: 1/19/24  PCP:  Melo Alvarado MD  Note Started: 2:18 AM EST      CHIEF COMPLAINT       Chief Complaint   Patient presents with    Urinary Retention       HISTORY OF PRESENT ILLNESS  (Location/Symptom, Timing/Onset, Context/Setting, Quality, Duration, Modifying Factors, Severity.)      Shukri Stephenson Sr. is a 89 y.o. male who presents with urinary retention.  He states that he has been having urinary retention since 6 PM today.  He has prior history of similar urinary retention few years back, and required a Lares.  Patient has a history of BPH.  He denies any pain in his genitals, abdomen, nausea, vomiting, fevers, chills, chest pain, shortness of breath.  Patient states that he is compliant on his medications.    PAST MEDICAL / SURGICAL / SOCIAL / FAMILY HISTORY      has no past medical history on file.     has no past surgical history on file.    Social History     Socioeconomic History    Marital status:      Spouse name: Not on file    Number of children: Not on file    Years of education: Not on file    Highest education level: Not on file   Occupational History    Not on file   Tobacco Use    Smoking status: Some Days     Types: Cigarettes    Smokeless tobacco: Not on file    Tobacco comments:     Smokes cigars with friends occasionally   Substance and Sexual Activity    Alcohol use: Not on file    Drug use: Not on file    Sexual activity: Not on file   Other Topics Concern    Not on file   Social History Narrative    Not on file     Social Determinants of Health     Financial Resource Strain: Low Risk  (7/25/2023)    Overall Financial Resource Strain (CARDIA)     Difficulty of Paying Living Expenses: Not hard at all   Food Insecurity: Not on file (7/25/2023)   Transportation Needs: Unknown (7/25/2023)    PRAPARE -

## 2024-01-19 NOTE — ED NOTES
Pt presents to ED via walking through triage c/o urinary retention since 5 pm tonight. Pt denies any nausea or vomiting. Pt also reports blood in urine. Pt reports walking to hospital and skin is cool to touch upon arrival. Pt is also bleeding from penis. Pt reports bleeding began around 5 pm.   Pt is hypertensive upon triage.  Pt reports compliance with medications  Pt placed on full cardiac monitor.  Call light within reach

## 2024-01-19 NOTE — DISCHARGE INSTRUCTIONS
You were seen today in the emergency department for your urinary retention.  You have made the decision to leave AGAINST MEDICAL ADVICE.  You understand the risks of this which include disability, worsening condition or death.  If you change your mind please come back to the emergency department.  Please call the urologist and your family doctor today to make an appointment for soon as possible    Please return to the emergency department immediately if develop any new or worsening concerns including chest pain, shortness of breath, abdominal pain, nausea, vomiting, diarrhea, weakness, loss consciousness, fever, chills, or any other concerns.    Please call your PCP and schedule appointment within the next 24 to 48 hours for follow-up.

## 2024-01-19 NOTE — ED NOTES
The following labs were labeled with appropriate pt sticker and tubed to lab:     [] Blue     [] Lavender   [] on ice  [] Green/yellow  [] Green/black [] on ice  [] Grey  [] on ice  [] Yellow  [] Red  [] Pink  [] Type/ Screen  [] ABG  [] VBG    [x] COVID-19 swab    [x] Rapid  [] PCR  [] Flu swab  [] Peds Viral Panel     [] Urine Sample  [] Fecal Sample  [] Pelvic Cultures  [] Blood Cultures  [] X 2  [] STREP Cultures  [] Wound Cultures

## 2024-01-19 NOTE — ED NOTES
ED to inpatient nurses report      Chief Complaint:  Chief Complaint   Patient presents with    Urinary Retention     Present to ED from: Home    MOA:     LOC: alert and orientated to name, place, date  Mobility: Requires assistance * 1  Oxygen Baseline: Room air    Current needs required: 3L NC   Pending ED orders: Urine sample  Present condition: HTN    Why did the patient come to the ED? Pt presents to ED via walking through triage c/o urinary retention since 5 pm tonight. Pt denies any nausea or vomiting. Pt also reports blood in urine. Pt reports walking to hospital and skin is cool to touch upon arrival. Pt is also bleeding from penis. Pt reports bleeding began around 5 pm.   Pt is hypertensive upon triage.  Pt reports compliance with medications  Pt placed on full cardiac monitor.  Call light within reach  What is the plan? Admit, urology consult  Any procedures or intervention occur? Labs, urology consult  Any safety concerns??    Mental Status:  Level of Consciousness: Alert (0)    Psych Assessment:   Psychosocial  Psychosocial (WDL): Within Defined Limits  Vital signs   Vitals:    01/18/24 2321 01/18/24 2330 01/18/24 2344 01/18/24 2359   BP:  (!) 205/111 (!) 198/79 (!) 194/89   Pulse:  77 95 90   Resp:  22 18 27   Temp: 97.7 °F (36.5 °C) 98.3 °F (36.8 °C)     TempSrc:  Oral     SpO2:  97% 95% 90%   Weight:       Height:            Vitals:  Patient Vitals for the past 24 hrs:   BP Temp Temp src Pulse Resp SpO2 Height Weight   01/18/24 2359 (!) 194/89 -- -- 90 27 90 % -- --   01/18/24 2344 (!) 198/79 -- -- 95 18 95 % -- --   01/18/24 2330 (!) 205/111 98.3 °F (36.8 °C) Oral 77 22 97 % -- --   01/18/24 2321 -- 97.7 °F (36.5 °C) -- -- -- -- -- --   01/18/24 2259 (!) 178/76 -- Oral 84 18 96 % 1.727 m (5' 8\") 74.8 kg (165 lb)      Visit Vitals  BP (!) 194/89   Pulse 90   Temp 98.3 °F (36.8 °C) (Oral)   Resp 27   Ht 1.727 m (5' 8\")   Wt 74.8 kg (165 lb)   SpO2 90%   BMI 25.09 kg/m²        LDAs:   Peripheral IV  (CARDIA)     Difficulty of Paying Living Expenses: Not hard at all   Transportation Needs: Unknown (7/25/2023)    PRAPARE - Transportation     Lack of Transportation (Non-Medical): No   Housing Stability: Unknown (7/25/2023)    Housing Stability Vital Sign     Unstable Housing in the Last Year: No       FAMILY HISTORY     History reviewed. No pertinent family history.    ALLERGIES     Lisinopril    CURRENT MEDICATIONS       Previous Medications    AMLODIPINE (NORVASC) 5 MG TABLET    Take 1 tablet by mouth daily    ASPIRIN 81 MG EC TABLET    Take 1 tablet by mouth daily    FUROSEMIDE (LASIX) 40 MG TABLET    take 1 tablet by mouth once daily    HYDRALAZINE (APRESOLINE) 25 MG TABLET    1 tablet with food Orally Three times a day for 90 days    HYDROCHLOROTHIAZIDE (HYDRODIURIL) 25 MG TABLET    Take 1 tablet by mouth daily    LATANOPROST (XALATAN) 0.005 % OPHTHALMIC SOLUTION    1 drop nightly 1 drop into both eyes every night    LOSARTAN (COZAAR) 100 MG TABLET    Take 1 tablet by mouth daily Take 1 tab everyday    METOPROLOL SUCCINATE (TOPROL XL) 25 MG EXTENDED RELEASE TABLET    TAKE 1 TABLET EVERY DAY Orally Once a day for 90 days    METOPROLOL SUCCINATE (TOPROL XL) 50 MG EXTENDED RELEASE TABLET    Take 1 tablet by mouth daily    NIFEDIPINE (PROCARDIA XL) 90 MG EXTENDED RELEASE TABLET    Take 1 tablet by mouth daily    TAMSULOSIN (FLOMAX) 0.4 MG CAPSULE    take 1 capsule by mouth at bedtime    TAMSULOSIN (FLOMAX) 0.4 MG CAPSULE    Take 1 capsule by mouth    TIMOLOL (TIMOPTIC) 0.5 % OPHTHALMIC SOLUTION    1 drop 2 times daily     Orders Placed This Encounter   Medications    hydrALAZINE (APRESOLINE) injection 10 mg    DISCONTD: lidocaine (XYLOCAINE) 2 % jelly    lidocaine (XYLOCAINE) 2 % uro-jet       SURGICAL HISTORY     History reviewed. No pertinent surgical history.    PAST MEDICAL HISTORY     History reviewed. No pertinent past medical history.    Labs:  Labs Reviewed   CBC WITH AUTO DIFFERENTIAL - Abnormal; Notable

## 2024-01-20 LAB
EKG ATRIAL RATE: 81 BPM
EKG ATRIAL RATE: 83 BPM
EKG P AXIS: 22 DEGREES
EKG P AXIS: 60 DEGREES
EKG P-R INTERVAL: 278 MS
EKG P-R INTERVAL: 296 MS
EKG Q-T INTERVAL: 388 MS
EKG Q-T INTERVAL: 414 MS
EKG QRS DURATION: 138 MS
EKG QRS DURATION: 142 MS
EKG QTC CALCULATION (BAZETT): 450 MS
EKG QTC CALCULATION (BAZETT): 486 MS
EKG R AXIS: -15 DEGREES
EKG R AXIS: -17 DEGREES
EKG T AXIS: 14 DEGREES
EKG T AXIS: 2 DEGREES
EKG VENTRICULAR RATE: 81 BPM
EKG VENTRICULAR RATE: 83 BPM

## 2024-01-22 PROCEDURE — 93010 ELECTROCARDIOGRAM REPORT: CPT | Performed by: INTERNAL MEDICINE

## 2024-02-01 ENCOUNTER — OFFICE VISIT (OUTPATIENT)
Dept: INTERNAL MEDICINE | Age: 89
End: 2024-02-01

## 2024-02-01 VITALS
DIASTOLIC BLOOD PRESSURE: 72 MMHG | BODY MASS INDEX: 23.79 KG/M2 | HEIGHT: 68 IN | OXYGEN SATURATION: 97 % | WEIGHT: 157 LBS | HEART RATE: 70 BPM | SYSTOLIC BLOOD PRESSURE: 130 MMHG | RESPIRATION RATE: 16 BRPM | TEMPERATURE: 97 F

## 2024-02-01 DIAGNOSIS — I10 PRIMARY HYPERTENSION: ICD-10-CM

## 2024-02-01 DIAGNOSIS — R33.9 URINARY RETENTION: Primary | ICD-10-CM

## 2024-02-01 DIAGNOSIS — I70.1 RENAL ARTERY STENOSIS (HCC): ICD-10-CM

## 2024-02-01 DIAGNOSIS — I50.22 CHRONIC SYSTOLIC (CONGESTIVE) HEART FAILURE (HCC): ICD-10-CM

## 2024-02-01 DIAGNOSIS — Z09 HOSPITAL DISCHARGE FOLLOW-UP: ICD-10-CM

## 2024-02-01 DIAGNOSIS — N18.31 STAGE 3A CHRONIC KIDNEY DISEASE (HCC): ICD-10-CM

## 2024-02-01 RX ORDER — LOSARTAN POTASSIUM 25 MG/1
25 TABLET ORAL DAILY
Qty: 30 TABLET | Refills: 2 | Status: SHIPPED | OUTPATIENT
Start: 2024-02-01

## 2024-02-01 ASSESSMENT — PATIENT HEALTH QUESTIONNAIRE - PHQ9
2. FEELING DOWN, DEPRESSED OR HOPELESS: 0
SUM OF ALL RESPONSES TO PHQ QUESTIONS 1-9: 0
1. LITTLE INTEREST OR PLEASURE IN DOING THINGS: 0
SUM OF ALL RESPONSES TO PHQ9 QUESTIONS 1 & 2: 0
SUM OF ALL RESPONSES TO PHQ QUESTIONS 1-9: 0

## 2024-02-01 NOTE — PROGRESS NOTES
Attending Physician Statement  I have discussed the care of Shukri Stephenson Sr., including pertinent history and exam findings with the resident. I have reviewed the key elements of all parts of the encounter with the resident.  I agree with the assessment, and status of the problem list as documented. The plan and orders should include   Orders Placed This Encounter   Procedures    [place] DISCHARGE MEDS RECONCILED W/ CURRENT OUTPATIENT MED LIST    and this was also documented by the resident. .     Diagnosis Orders   1. Urinary retention  [place] DISCHARGE MEDS RECONCILED W/ CURRENT OUTPATIENT MED LIST      2. Renal artery stenosis (HCC)        3. Stage 3a chronic kidney disease (HCC)        4. Chronic systolic (congestive) heart failure        5. Hospital discharge follow-up  [place] DISCHARGE MEDS RECONCILED W/ CURRENT OUTPATIENT MED LIST      6. Primary hypertension  losartan (COZAAR) 25 MG tablet           Miriam De La Fuente MD   Attending Physician, [facility]   Faculty, Internal Medicine Residency Program  [facility]     
TABLET EVERY DAY Orally Once a day for 90 days      NIFEdipine (PROCARDIA XL) 90 MG extended release tablet Take 1 tablet by mouth daily      tamsulosin (FLOMAX) 0.4 MG capsule Take 1 capsule by mouth      furosemide (LASIX) 40 MG tablet take 1 tablet by mouth once daily 30 tablet 2    tamsulosin (FLOMAX) 0.4 MG capsule take 1 capsule by mouth at bedtime 30 capsule 2    amLODIPine (NORVASC) 5 MG tablet Take 1 tablet by mouth daily 30 tablet 5    aspirin 81 MG EC tablet Take 1 tablet by mouth daily      losartan (COZAAR) 100 MG tablet Take 1 tablet by mouth daily Take 1 tab everyday 30 tablet 0    latanoprost (XALATAN) 0.005 % ophthalmic solution 1 drop nightly 1 drop into both eyes every night      timolol (TIMOPTIC) 0.5 % ophthalmic solution 1 drop 2 times daily          Medications patient taking as of now reconciled against medications ordered at time of hospital discharge: Yes    A comprehensive review of systems was negative except for what was noted in the HPI.    Objective:    BP (!) 142/80 (Site: Right Upper Arm, Position: Sitting)   Pulse 70   Temp 97 °F (36.1 °C)   Resp 16   Ht 1.727 m (5' 8\")   Wt 71.2 kg (157 lb)   SpO2 97%   BMI 23.87 kg/m²   General Appearance: alert and oriented to person, place and time, well-developed and well-nourished, in no acute distress  Eyes: pupils equal, round, and reactive to light, extraocular eye movements intact, conjunctivae normal  Pulmonary/Chest: clear to auscultation bilaterally- no wheezes, rales or rhonchi, normal air movement, no respiratory distress  Cardiovascular: normal rate, normal S1 and S2, no gallops, intact distal pulses, and no carotid bruits  Abdomen: soft, non-tender and non-distended  Genitourinary: Lares's catheter in place  Extremities: no cyanosis and no clubbing  Neurologic: gait and coordination normal and speech normal      An electronic signature was used to authenticate this note.  Daniela Mueller MD  Internal medicine resident,

## 2024-02-19 DIAGNOSIS — R60.0 BILATERAL LEG EDEMA: ICD-10-CM

## 2024-02-19 DIAGNOSIS — N40.0 BENIGN PROSTATIC HYPERPLASIA WITHOUT LOWER URINARY TRACT SYMPTOMS: ICD-10-CM

## 2024-02-19 DIAGNOSIS — I10 PRIMARY HYPERTENSION: ICD-10-CM

## 2024-02-19 NOTE — TELEPHONE ENCOUNTER
..Request for   Requested Prescriptions     Pending Prescriptions Disp Refills    furosemide (LASIX) 40 MG tablet [Pharmacy Med Name: FUROSEMIDE 40 MG TABLET] 30 tablet 2     Sig: take 1 tablet by mouth once daily    tamsulosin (FLOMAX) 0.4 MG capsule [Pharmacy Med Name: TAMSULOSIN HCL 0.4 MG CAPSULE] 30 capsule 2     Sig: take 1 capsule by mouth at bedtime    .      Please review and e-scribe to pharmacy listed in chart if appropriate. Thank you.      Last Visit Date: 2/1/2024  Next Visit Date: 3/22/2024    Future Appointments   Date Time Provider Department Center   3/22/2024 10:00 AM Melo Alvarado MD [id number]       Health Maintenance   Topic Date Due    Respiratory Syncytial Virus (RSV) Pregnant or age 60 yrs+ (1 - 1-dose 60+ series) Never done    Polio vaccine (2 of 3 - Adult catch-up series) 06/13/2001    COVID-19 Vaccine (4 - 2023-24 season) 09/01/2023    Annual Wellness Visit (Medicare)  Never done    Pneumococcal 65+ years Vaccine (1 - PCV) 08/30/2024 (Originally 10/4/1938)    Flu vaccine (1) 09/22/2024 (Originally 8/1/2023)    Shingles vaccine (1 of 2) 09/22/2024 (Originally 10/4/1982)    Depression Screen  02/01/2025    DTaP/Tdap/Td vaccine (4 - Td or Tdap) 01/30/2033    Hepatitis A vaccine  Aged Out    Hepatitis B vaccine  Aged Out    Hib vaccine  Aged Out    Meningococcal (ACWY) vaccine  Aged Out       No results found for: \"LABA1C\"          ( goal A1C is < 7)   No components found for: \"LABMICR\"  No results found for: \"LDLCHOLESTEROL\", \"LDLCALC\"    (goal LDL is <100)   AST (U/L)   Date Value   01/18/2024 23     ALT (U/L)   Date Value   01/18/2024 16     BUN (mg/dL)   Date Value   01/18/2024 24 (H)     BP Readings from Last 3 Encounters:   02/01/24 130/72   01/19/24 (!) 162/77   10/13/23 (!) 161/70          (goal 120/80)    All Future Testing planned in [facility]        Patient Active Problem List:     Bilateral carotid artery stenosis     BPH (benign prostatic hyperplasia)     Primary

## 2024-02-20 RX ORDER — TAMSULOSIN HYDROCHLORIDE 0.4 MG/1
0.4 CAPSULE ORAL
Qty: 30 CAPSULE | Refills: 2 | Status: SHIPPED | OUTPATIENT
Start: 2024-02-20

## 2024-02-20 RX ORDER — FUROSEMIDE 40 MG/1
40 TABLET ORAL DAILY
Qty: 30 TABLET | Refills: 2 | Status: SHIPPED | OUTPATIENT
Start: 2024-02-20

## 2024-03-22 ENCOUNTER — OFFICE VISIT (OUTPATIENT)
Dept: INTERNAL MEDICINE | Age: 89
End: 2024-03-22
Payer: MEDICARE

## 2024-03-22 VITALS
HEART RATE: 52 BPM | BODY MASS INDEX: 26.25 KG/M2 | OXYGEN SATURATION: 99 % | SYSTOLIC BLOOD PRESSURE: 144 MMHG | TEMPERATURE: 97.3 F | HEIGHT: 68 IN | WEIGHT: 173.2 LBS | DIASTOLIC BLOOD PRESSURE: 82 MMHG

## 2024-03-22 DIAGNOSIS — N40.0 BENIGN PROSTATIC HYPERPLASIA, UNSPECIFIED WHETHER LOWER URINARY TRACT SYMPTOMS PRESENT: ICD-10-CM

## 2024-03-22 DIAGNOSIS — I10 PRIMARY HYPERTENSION: Primary | ICD-10-CM

## 2024-03-22 DIAGNOSIS — I50.22 CHRONIC SYSTOLIC (CONGESTIVE) HEART FAILURE (HCC): ICD-10-CM

## 2024-03-22 DIAGNOSIS — N18.31 STAGE 3A CHRONIC KIDNEY DISEASE (HCC): ICD-10-CM

## 2024-03-22 PROCEDURE — G8484 FLU IMMUNIZE NO ADMIN: HCPCS

## 2024-03-22 PROCEDURE — 99213 OFFICE O/P EST LOW 20 MIN: CPT

## 2024-03-22 PROCEDURE — G8427 DOCREV CUR MEDS BY ELIG CLIN: HCPCS

## 2024-03-22 PROCEDURE — 99214 OFFICE O/P EST MOD 30 MIN: CPT

## 2024-03-22 PROCEDURE — 1123F ACP DISCUSS/DSCN MKR DOCD: CPT

## 2024-03-22 PROCEDURE — G8419 CALC BMI OUT NRM PARAM NOF/U: HCPCS

## 2024-03-22 PROCEDURE — 4004F PT TOBACCO SCREEN RCVD TLK: CPT

## 2024-03-22 RX ORDER — SPIRONOLACTONE 25 MG/1
12.5 TABLET ORAL DAILY
Qty: 15 TABLET | Refills: 3 | Status: SHIPPED | OUTPATIENT
Start: 2024-03-22

## 2024-03-22 ASSESSMENT — ENCOUNTER SYMPTOMS
ABDOMINAL PAIN: 0
STRIDOR: 0
SHORTNESS OF BREATH: 0
NAUSEA: 0
DIARRHEA: 0
VOMITING: 0

## 2024-03-22 NOTE — PROGRESS NOTES
Attending Physician Statement  I have discussed the care of Shukri Stephenson Sr., including pertinent history and exam findings with the resident. I have reviewed the key elements of all parts of the encounter with the resident. I have seen and examined the patient with the resident and the key elements of all parts of the encounter have been performed by me. I agree with the assessment, and status of the problem list as documented. The plan and orders should include No orders of the defined types were placed in this encounter.   and this was also documented by the resident. The medication list was reviewed with the resident and is up to date. The return visit should be in 4 weeks .    Robert Jefferson MD  Attending Physician,  Department of Internal Medicine  [facility] Internal Medicine  [facility]      3/22/2024, 10:28 AM    
and stridor.    Cardiovascular:  Positive for leg swelling. Negative for chest pain.   Gastrointestinal:  Negative for abdominal pain, diarrhea, nausea and vomiting.   Genitourinary:  Negative for difficulty urinating, frequency and hematuria.   Musculoskeletal:  Negative for arthralgias and myalgias.   Neurological:  Negative for dizziness and weakness.       PHYSICAL EXAM:  Vitals:    03/22/24 0935   Height: 1.727 m (5' 8\")     BP Readings from Last 3 Encounters:   02/01/24 130/72   01/19/24 (!) 162/77   10/13/23 (!) 161/70        Physical Exam  Constitutional:       General: He is awake. He is not in acute distress.  Cardiovascular:      Rate and Rhythm: Normal rate and regular rhythm.      Heart sounds: Normal heart sounds.   Pulmonary:      Effort: Pulmonary effort is normal.      Breath sounds: Normal breath sounds. No decreased breath sounds, wheezing or rhonchi.   Musculoskeletal:      Right lower leg: Swelling present.      Left lower leg: Swelling present.   Neurological:      General: No focal deficit present.      Mental Status: He is alert and oriented to person, place, and time.      Cranial Nerves: Cranial nerves 2-12 are intact.      Sensory: Sensation is intact.      Motor: Motor function is intact.   Psychiatric:         Attention and Perception: Attention normal.         Mood and Affect: Mood normal.         Speech: Speech normal.         Behavior: Behavior is cooperative.         Cognition and Memory: Cognition normal.           DIAGNOSTIC FINDINGS:  CBC:  Lab Results   Component Value Date/Time    WBC 10.8 01/18/2024 11:36 PM    HGB 10.4 01/18/2024 11:36 PM     01/18/2024 11:36 PM       BMP:    Lab Results   Component Value Date/Time     01/18/2024 11:36 PM    K 4.0 01/18/2024 11:36 PM     01/18/2024 11:36 PM    CO2 22 01/18/2024 11:36 PM    BUN 24 01/18/2024 11:36 PM    CREATININE 1.3 01/18/2024 11:36 PM    GLUCOSE 154 01/18/2024 11:36 PM       HEMOGLOBIN A1C: No results

## 2024-04-19 ENCOUNTER — OFFICE VISIT (OUTPATIENT)
Dept: INTERNAL MEDICINE | Age: 89
End: 2024-04-19
Payer: MEDICARE

## 2024-04-19 VITALS
TEMPERATURE: 98.1 F | WEIGHT: 151.8 LBS | BODY MASS INDEX: 23.01 KG/M2 | DIASTOLIC BLOOD PRESSURE: 60 MMHG | HEIGHT: 68 IN | HEART RATE: 60 BPM | OXYGEN SATURATION: 100 % | SYSTOLIC BLOOD PRESSURE: 118 MMHG

## 2024-04-19 DIAGNOSIS — N40.0 BENIGN PROSTATIC HYPERPLASIA WITHOUT LOWER URINARY TRACT SYMPTOMS: ICD-10-CM

## 2024-04-19 DIAGNOSIS — I50.22 CHRONIC SYSTOLIC (CONGESTIVE) HEART FAILURE (HCC): ICD-10-CM

## 2024-04-19 DIAGNOSIS — I10 PRIMARY HYPERTENSION: Primary | ICD-10-CM

## 2024-04-19 PROCEDURE — 1123F ACP DISCUSS/DSCN MKR DOCD: CPT

## 2024-04-19 PROCEDURE — 99213 OFFICE O/P EST LOW 20 MIN: CPT

## 2024-04-19 PROCEDURE — G8420 CALC BMI NORM PARAMETERS: HCPCS

## 2024-04-19 PROCEDURE — G8427 DOCREV CUR MEDS BY ELIG CLIN: HCPCS

## 2024-04-19 PROCEDURE — 4004F PT TOBACCO SCREEN RCVD TLK: CPT

## 2024-04-19 NOTE — PROGRESS NOTES
Attending Physician Statement  I have discussed the care of Shukri Stephenson Sr.,  including pertinent history and exam findings,  with the resident. I have seen and examined the patient and the key elements of all parts of the encounter have been performed by me.  I agree with the assessment, plan and orders as documented by the resident.  (GC Modifier)     Diagnosis Orders   1. Primary hypertension        2. Chronic systolic (congestive) heart failure        3. Benign prostatic hyperplasia without lower urinary tract symptoms              MD ARTURO Levine  Attending Physician  Internal Medicine Residency Program, Nephrology   [facility]  4/19/2024, 11:36 AM  
01/18/2024 11:36 PM       HEMOGLOBIN A1C: No results found for: \"LABA1C\"    FASTING LIPID PANEL:No results found for: \"CHOL\", \"HDL\", \"TRIG\"     Diagnosis Orders   1. Primary hypertension        2. Chronic systolic (congestive) heart failure        3. Benign prostatic hyperplasia without lower urinary tract symptoms             ASSESSMENT AND PLAN:    Shukri was seen today for hypertension and congestive heart failure.    Diagnoses and all orders for this visit:    Primary hypertension.  Will continue current regimen of losartan, Lasix and Aldactone    Chronic systolic (congestive) heart failure.  EF is 20 to 30%, referral to cardiology has been provided which patient has not followed up on.  Counseled regarding the importance of following with cardiology    Benign prostatic hyperplasia without lower urinary tract symptoms.  Patient has a recent episode of acute urinary retention, for which he required Lares placement for a few weeks, it has been since removed.  He follows up with urology    FOLLOW UP AND INSTRUCTIONS:  No follow-ups on file.    Shukri received counseling on the following healthy behaviors: medication adherence    Discussed use, benefit, and side effects of prescribed medications.  Barriers to medication compliance addressed.  All patient questions answered.  Pt voiced understanding.    Patient given educational materials - see patient instructions  Melo Alvarado MD   Internal Medicine  4/19/2024, 10:41 AM    This note is created with the assistance of a speech-recognition program. While intending to generate a document that actually reflects the content of thevisit, the document can still have some mistakes which may not have been identified and corrected by editing.

## 2024-06-06 DIAGNOSIS — I10 PRIMARY HYPERTENSION: ICD-10-CM

## 2024-06-06 RX ORDER — LOSARTAN POTASSIUM 25 MG/1
25 TABLET ORAL DAILY
Qty: 90 TABLET | Refills: 3 | Status: SHIPPED | OUTPATIENT
Start: 2024-06-06

## 2024-06-24 DIAGNOSIS — I50.22 CHRONIC SYSTOLIC (CONGESTIVE) HEART FAILURE (HCC): ICD-10-CM

## 2024-06-25 RX ORDER — SPIRONOLACTONE 25 MG/1
12.5 TABLET ORAL DAILY
Qty: 15 TABLET | Refills: 3 | Status: SHIPPED | OUTPATIENT
Start: 2024-06-25

## 2024-08-01 DIAGNOSIS — N40.0 BENIGN PROSTATIC HYPERPLASIA WITHOUT LOWER URINARY TRACT SYMPTOMS: ICD-10-CM

## 2024-08-01 NOTE — TELEPHONE ENCOUNTER
Shukri Stephenson Sr. is calling to request a refill on the following medication(s):    Medication Request:  Requested Prescriptions     Pending Prescriptions Disp Refills    tamsulosin (FLOMAX) 0.4 MG capsule [Pharmacy Med Name: TAMSULOSIN 0.4MG CAP] 180 capsule 0       Last Visit Date (If Applicable):  4/19/2024    Next Visit Date:    Visit date not found

## 2024-08-07 RX ORDER — TAMSULOSIN HYDROCHLORIDE 0.4 MG/1
0.4 CAPSULE ORAL DAILY
Qty: 180 CAPSULE | Refills: 0 | Status: SHIPPED | OUTPATIENT
Start: 2024-08-07

## 2024-08-30 ENCOUNTER — OFFICE VISIT (OUTPATIENT)
Dept: INTERNAL MEDICINE | Age: 89
End: 2024-08-30
Payer: MEDICARE

## 2024-08-30 VITALS
OXYGEN SATURATION: 97 % | WEIGHT: 146.2 LBS | BODY MASS INDEX: 22.16 KG/M2 | HEIGHT: 68 IN | DIASTOLIC BLOOD PRESSURE: 67 MMHG | SYSTOLIC BLOOD PRESSURE: 137 MMHG | HEART RATE: 55 BPM | TEMPERATURE: 97.9 F

## 2024-08-30 DIAGNOSIS — H61.21 CERUMEN DEBRIS ON TYMPANIC MEMBRANE OF RIGHT EAR: Primary | ICD-10-CM

## 2024-08-30 PROCEDURE — G8420 CALC BMI NORM PARAMETERS: HCPCS

## 2024-08-30 PROCEDURE — 99213 OFFICE O/P EST LOW 20 MIN: CPT

## 2024-08-30 PROCEDURE — 1123F ACP DISCUSS/DSCN MKR DOCD: CPT

## 2024-08-30 PROCEDURE — G8427 DOCREV CUR MEDS BY ELIG CLIN: HCPCS

## 2024-08-30 PROCEDURE — 4004F PT TOBACCO SCREEN RCVD TLK: CPT

## 2024-08-30 SDOH — ECONOMIC STABILITY: FOOD INSECURITY: WITHIN THE PAST 12 MONTHS, THE FOOD YOU BOUGHT JUST DIDN'T LAST AND YOU DIDN'T HAVE MONEY TO GET MORE.: NEVER TRUE

## 2024-08-30 SDOH — ECONOMIC STABILITY: FOOD INSECURITY: WITHIN THE PAST 12 MONTHS, YOU WORRIED THAT YOUR FOOD WOULD RUN OUT BEFORE YOU GOT MONEY TO BUY MORE.: NEVER TRUE

## 2024-08-30 SDOH — ECONOMIC STABILITY: INCOME INSECURITY: HOW HARD IS IT FOR YOU TO PAY FOR THE VERY BASICS LIKE FOOD, HOUSING, MEDICAL CARE, AND HEATING?: NOT VERY HARD

## 2024-08-30 ASSESSMENT — PATIENT HEALTH QUESTIONNAIRE - PHQ9
SUM OF ALL RESPONSES TO PHQ QUESTIONS 1-9: 0
2. FEELING DOWN, DEPRESSED OR HOPELESS: NOT AT ALL
SUM OF ALL RESPONSES TO PHQ QUESTIONS 1-9: 0
1. LITTLE INTEREST OR PLEASURE IN DOING THINGS: NOT AT ALL
SUM OF ALL RESPONSES TO PHQ QUESTIONS 1-9: 0
SUM OF ALL RESPONSES TO PHQ QUESTIONS 1-9: 0
SUM OF ALL RESPONSES TO PHQ9 QUESTIONS 1 & 2: 0

## 2024-08-30 NOTE — PROGRESS NOTES
Attending Physician Statement  I have discussed the care of Shukri Stephenson, including pertinent history and exam findings,  with the resident. I have reviewed the key elements of all parts of the encounter with the resident.  I agree with the assessment, plan and orders as documented by the resident.  (GE Modifier)

## 2024-08-30 NOTE — PROGRESS NOTES
[facility]  [facility]  [address] [place] [address]  Dept: [phone]  Dept Fax: [phone]    Office Progress/Follow Up Note  Date ofpatient's visit: 8/30/2024  Patient's Name:  Shukri Stephenson Sr. YOB: 1932            Patient Care Team:  Melo Alvarado MD as PCP - General (Internal Medicine)  Mady Prado MD as PCP - Empaneled Provider  ================================================================    REASON FOR VISIT/CHIEF COMPLAINT:  FOLLOW-UP VISIT; No chief complaint on file.    HISTORY OF PRESENTING ILLNESS:  History was obtained from: patient, electronic medical record. Shukri Stephenson Sr.is a 89 y.o. is here for a acute care visit.     Past medical history is significant for hypertension, peripheral artery disease, HFrEF, bilateral carotid endarterectomies, BPH     HE comes in today for a acute care visit. He went to a free hearing check-up where he was told that they could not test his right ear due to earwax and to follow-up with his doctor.     He denies any pain, hearing loss, tinnitus or any discharge from the right ear.         Lab Results   Component Value Date    HGB 10.4 (L) 01/18/2024    CREATININE 1.3 (H) 01/18/2024         Patient Active Problem List   Diagnosis    Bilateral carotid artery stenosis    BPH (benign prostatic hyperplasia)    Primary hypertension    Primary open angle glaucoma (POAG)    Peripheral artery disease (HCC)    Renal artery stenosis (HCC)    Stage 3a chronic kidney disease (HCC)    Normocytic anemia    Mild aortic regurgitation    Mild aortic stenosis    Chronic systolic (congestive) heart failure       Health Maintenance Due   Topic Date Due    Respiratory Syncytial Virus (RSV) Pregnant or age 60 yrs+ (1 - 1-dose 60+ series) Never done    Polio vaccine (2 of 3 - Adult catch-up series) 06/13/2001    COVID-19 Vaccine (5 - 2023-24 season) 09/01/2023    Annual Wellness Visit (Medicare)  Never done    Flu

## 2024-09-09 ENCOUNTER — OFFICE VISIT (OUTPATIENT)
Dept: INTERNAL MEDICINE | Age: 89
End: 2024-09-09
Payer: MEDICARE

## 2024-09-09 VITALS
HEIGHT: 67 IN | BODY MASS INDEX: 23.39 KG/M2 | WEIGHT: 149 LBS | OXYGEN SATURATION: 96 % | DIASTOLIC BLOOD PRESSURE: 60 MMHG | SYSTOLIC BLOOD PRESSURE: 136 MMHG | HEART RATE: 57 BPM | TEMPERATURE: 97.9 F

## 2024-09-09 DIAGNOSIS — H61.21 CERUMEN DEBRIS ON TYMPANIC MEMBRANE OF RIGHT EAR: Primary | ICD-10-CM

## 2024-09-09 DIAGNOSIS — I10 PRIMARY HYPERTENSION: ICD-10-CM

## 2024-09-09 DIAGNOSIS — R56.9 FOCAL SEIZURE (HCC): ICD-10-CM

## 2024-09-09 PROCEDURE — G8427 DOCREV CUR MEDS BY ELIG CLIN: HCPCS

## 2024-09-09 PROCEDURE — 1123F ACP DISCUSS/DSCN MKR DOCD: CPT

## 2024-09-09 PROCEDURE — G8420 CALC BMI NORM PARAMETERS: HCPCS

## 2024-09-09 PROCEDURE — 99211 OFF/OP EST MAY X REQ PHY/QHP: CPT | Performed by: INTERNAL MEDICINE

## 2024-09-09 PROCEDURE — 4004F PT TOBACCO SCREEN RCVD TLK: CPT

## 2024-09-09 PROCEDURE — 99213 OFFICE O/P EST LOW 20 MIN: CPT

## 2024-09-09 RX ORDER — LOSARTAN POTASSIUM 25 MG/1
25 TABLET ORAL DAILY
Qty: 90 TABLET | Refills: 3 | Status: SHIPPED | OUTPATIENT
Start: 2024-09-09

## 2024-09-09 ASSESSMENT — ENCOUNTER SYMPTOMS
RHINORRHEA: 0
BACK PAIN: 0
SHORTNESS OF BREATH: 0
COUGH: 0
SINUS PAIN: 0

## 2024-09-09 ASSESSMENT — PATIENT HEALTH QUESTIONNAIRE - PHQ9
SUM OF ALL RESPONSES TO PHQ QUESTIONS 1-9: 1
SUM OF ALL RESPONSES TO PHQ QUESTIONS 1-9: 1
2. FEELING DOWN, DEPRESSED OR HOPELESS: NOT AT ALL
SUM OF ALL RESPONSES TO PHQ QUESTIONS 1-9: 1
SUM OF ALL RESPONSES TO PHQ QUESTIONS 1-9: 1
SUM OF ALL RESPONSES TO PHQ9 QUESTIONS 1 & 2: 1
1. LITTLE INTEREST OR PLEASURE IN DOING THINGS: SEVERAL DAYS

## 2024-09-18 DIAGNOSIS — I50.22 CHRONIC SYSTOLIC (CONGESTIVE) HEART FAILURE (HCC): ICD-10-CM

## 2024-09-19 RX ORDER — SPIRONOLACTONE 25 MG/1
12.5 TABLET ORAL DAILY
Qty: 45 TABLET | Refills: 3 | Status: SHIPPED | OUTPATIENT
Start: 2024-09-19

## 2025-01-01 DIAGNOSIS — N40.0 BENIGN PROSTATIC HYPERPLASIA WITHOUT LOWER URINARY TRACT SYMPTOMS: ICD-10-CM

## 2025-01-02 NOTE — TELEPHONE ENCOUNTER
Shukri Stephenson Sr. is calling to request a refill on the following medication(s):    Medication Request:  Requested Prescriptions     Pending Prescriptions Disp Refills    tamsulosin (FLOMAX) 0.4 MG capsule [Pharmacy Med Name: Tamsulosin HCl Oral Capsule 0.4 MG] 90 capsule 3     Sig: TAKE 1 CAPSULE EVERY DAY       Last Visit Date (If Applicable):  9/9/2024    Next Visit Date:    1/31/2025

## 2025-01-03 RX ORDER — TAMSULOSIN HYDROCHLORIDE 0.4 MG/1
0.4 CAPSULE ORAL DAILY
Qty: 90 CAPSULE | Refills: 3 | Status: SHIPPED | OUTPATIENT
Start: 2025-01-03

## 2025-02-14 ENCOUNTER — OFFICE VISIT (OUTPATIENT)
Dept: INTERNAL MEDICINE | Age: 89
End: 2025-02-14
Payer: MEDICARE

## 2025-02-14 ENCOUNTER — HOSPITAL ENCOUNTER (OUTPATIENT)
Age: 89
Setting detail: SPECIMEN
Discharge: HOME | End: 2025-02-14

## 2025-02-14 VITALS
DIASTOLIC BLOOD PRESSURE: 56 MMHG | WEIGHT: 158 LBS | HEART RATE: 55 BPM | SYSTOLIC BLOOD PRESSURE: 146 MMHG | TEMPERATURE: 97.2 F | BODY MASS INDEX: 24.75 KG/M2 | OXYGEN SATURATION: 94 %

## 2025-02-14 DIAGNOSIS — N18.31 STAGE 3A CHRONIC KIDNEY DISEASE (HCC): ICD-10-CM

## 2025-02-14 DIAGNOSIS — I10 PRIMARY HYPERTENSION: Primary | ICD-10-CM

## 2025-02-14 DIAGNOSIS — I50.22 CHRONIC SYSTOLIC (CONGESTIVE) HEART FAILURE (HCC): ICD-10-CM

## 2025-02-14 DIAGNOSIS — I35.0 MILD AORTIC STENOSIS: ICD-10-CM

## 2025-02-14 DIAGNOSIS — I10 PRIMARY HYPERTENSION: ICD-10-CM

## 2025-02-14 LAB
ALBUMIN SERPL-MCNC: 4.1 G/DL (ref 3.5–5.2)
ALBUMIN/GLOB SERPL: 1.2 {RATIO} (ref 1–2.5)
ALP SERPL-CCNC: 101 U/L (ref 40–129)
ALT SERPL-CCNC: 8 U/L (ref 10–50)
ANION GAP SERPL CALCULATED.3IONS-SCNC: 11 MMOL/L (ref 9–16)
AST SERPL-CCNC: 25 U/L (ref 10–50)
BASOPHILS # BLD: 0.04 K/UL (ref 0–0.2)
BASOPHILS NFR BLD: 1 % (ref 0–2)
BILIRUB SERPL-MCNC: 0.3 MG/DL (ref 0–1.2)
BUN SERPL-MCNC: 24 MG/DL (ref 8–23)
CALCIUM SERPL-MCNC: 9.4 MG/DL (ref 8.6–10.4)
CHLORIDE SERPL-SCNC: 103 MMOL/L (ref 98–107)
CO2 SERPL-SCNC: 26 MMOL/L (ref 20–31)
CREAT SERPL-MCNC: 1.6 MG/DL (ref 0.7–1.2)
EOSINOPHIL # BLD: 0.08 K/UL (ref 0–0.44)
EOSINOPHILS RELATIVE PERCENT: 1 % (ref 1–4)
ERYTHROCYTE [DISTWIDTH] IN BLOOD BY AUTOMATED COUNT: 14 % (ref 11.8–14.4)
GFR, ESTIMATED: 40 ML/MIN/1.73M2
GLUCOSE SERPL-MCNC: 72 MG/DL (ref 74–99)
HCT VFR BLD AUTO: 39.6 % (ref 40.7–50.3)
HGB BLD-MCNC: 11.9 G/DL (ref 13–17)
IMM GRANULOCYTES # BLD AUTO: <0.03 K/UL (ref 0–0.3)
IMM GRANULOCYTES NFR BLD: 0 %
LYMPHOCYTES NFR BLD: 1.78 K/UL (ref 1.1–3.7)
LYMPHOCYTES RELATIVE PERCENT: 27 % (ref 24–43)
MCH RBC QN AUTO: 28.7 PG (ref 25.2–33.5)
MCHC RBC AUTO-ENTMCNC: 30.1 G/DL (ref 28.4–34.8)
MCV RBC AUTO: 95.4 FL (ref 82.6–102.9)
MONOCYTES NFR BLD: 0.5 K/UL (ref 0.1–1.2)
MONOCYTES NFR BLD: 8 % (ref 3–12)
NEUTROPHILS NFR BLD: 63 % (ref 36–65)
NEUTS SEG NFR BLD: 4.29 K/UL (ref 1.5–8.1)
NRBC BLD-RTO: 0 PER 100 WBC
PLATELET # BLD AUTO: 234 K/UL (ref 138–453)
PMV BLD AUTO: 12.3 FL (ref 8.1–13.5)
POTASSIUM SERPL-SCNC: 4.6 MMOL/L (ref 3.7–5.3)
PROT SERPL-MCNC: 7.4 G/DL (ref 6.6–8.7)
RBC # BLD AUTO: 4.15 M/UL (ref 4.21–5.77)
SODIUM SERPL-SCNC: 140 MMOL/L (ref 136–145)
WBC OTHER # BLD: 6.7 K/UL (ref 3.5–11.3)

## 2025-02-14 PROCEDURE — 99213 OFFICE O/P EST LOW 20 MIN: CPT

## 2025-02-14 SDOH — ECONOMIC STABILITY: FOOD INSECURITY: WITHIN THE PAST 12 MONTHS, THE FOOD YOU BOUGHT JUST DIDN'T LAST AND YOU DIDN'T HAVE MONEY TO GET MORE.: NEVER TRUE

## 2025-02-14 SDOH — ECONOMIC STABILITY: FOOD INSECURITY: WITHIN THE PAST 12 MONTHS, YOU WORRIED THAT YOUR FOOD WOULD RUN OUT BEFORE YOU GOT MONEY TO BUY MORE.: NEVER TRUE

## 2025-02-14 ASSESSMENT — PATIENT HEALTH QUESTIONNAIRE - PHQ9
SUM OF ALL RESPONSES TO PHQ QUESTIONS 1-9: 0
SUM OF ALL RESPONSES TO PHQ9 QUESTIONS 1 & 2: 0
SUM OF ALL RESPONSES TO PHQ QUESTIONS 1-9: 0
1. LITTLE INTEREST OR PLEASURE IN DOING THINGS: NOT AT ALL
2. FEELING DOWN, DEPRESSED OR HOPELESS: NOT AT ALL

## 2025-02-14 ASSESSMENT — ENCOUNTER SYMPTOMS
ABDOMINAL PAIN: 0
VOMITING: 0
NAUSEA: 0
DIARRHEA: 0

## 2025-02-14 NOTE — PROGRESS NOTES
Attending Physician Statement  I have discussed the care of Shukri Stephenson Sr., including pertinent history and exam findings with the resident. I have reviewed the key elements of all parts of the encounter with the resident.  I agree with the assessment, and status of the problem list as documented. The plan and orders should include   Orders Placed This Encounter   Procedures    CBC with Auto Differential    Comprehensive Metabolic Panel    AFL (Epic) - Marco Gagnon MD, Cardiology, [place]    and this was also documented by the resident.  The medication list was reviewed with the resident and is up to date. The return visit should be in 3 months .    Robert Jefferson MD  Attending Physician,  Department of Internal Medicine  [facility] Internal Medicine  [facility]      2/14/2025, 2:01 PM

## 2025-02-14 NOTE — PROGRESS NOTES
[facility]  [facility]  [address] [place] [address]  Dept: [phone]  Dept Fax: [phone]    Office Progress/Follow Up Note  Date ofpatient's visit: 2/14/2025  Patient's Name:  Shukri Stephenson Sr. YOB: 1932            Patient Care Team:  Melo Alvarado MD as PCP - General (Internal Medicine)  Mady Prado MD as PCP - Empaneled Provider  ================================================================    REASON FOR VISIT/CHIEF COMPLAINT:  FOLLOW-UP VISIT; Hypertension and Health Maintenance (Pneumococcal 50+ years Vaccine declined. Shingles vaccine declined. Polio vaccine declined. Respiratory Syncytial Virus (RSV) Pregnant or age 60 yrs+ vaccine declined. Flu vaccine declined. COVID -19 vaccine declined. Annual Wellness Visit (Medicare) due. //)    HISTORY OF PRESENTING ILLNESS:  History was obtained from: patient, electronic medical record. Shukri Stephenson Sr.is a 89 y.o. is here for a follow-up visit     Past medical history is significant for hypertension, peripheral artery disease, HFrEF, bilateral carotid endarterectomies, BPH.    His blood pressure in the office 140/56.  He denies any chest pain, shortness of breath, leg swelling, urinary or any bowel issues at this point of time.  He reports that he feels great and is very active.  He is refusing vaccines reports that he is feeling well and he has never had a flu shot in his life.        Lab Results   Component Value Date    HGB 10.4 (L) 01/18/2024    CREATININE 1.3 (H) 01/18/2024         Patient Active Problem List   Diagnosis    Bilateral carotid artery stenosis    BPH (benign prostatic hyperplasia)    Primary hypertension    Primary open angle glaucoma (POAG)    Peripheral artery disease (HCC)    Renal artery stenosis (HCC)    Stage 3a chronic kidney disease (HCC)    Normocytic anemia    Mild aortic regurgitation    moderate aortic stenosis    Chronic systolic (congestive) heart failure

## 2025-04-14 DIAGNOSIS — R60.0 BILATERAL LEG EDEMA: ICD-10-CM

## 2025-04-14 DIAGNOSIS — I10 PRIMARY HYPERTENSION: ICD-10-CM

## 2025-04-14 NOTE — TELEPHONE ENCOUNTER
Shukri Stephenson Sr. is calling to request a refill on the following medication(s):    Medication Request:  Requested Prescriptions     Pending Prescriptions Disp Refills    losartan (COZAAR) 25 MG tablet 90 tablet 3     Sig: Take 1 tablet by mouth daily    furosemide (LASIX) 40 MG tablet 30 tablet 2     Sig: Take 1 tablet by mouth daily       Last Visit Date (If Applicable):  2/14/2025    Next Visit Date:    5/16/2025

## 2025-04-16 RX ORDER — LOSARTAN POTASSIUM 25 MG/1
25 TABLET ORAL DAILY
Qty: 90 TABLET | Refills: 3 | Status: SHIPPED | OUTPATIENT
Start: 2025-04-16

## 2025-04-16 RX ORDER — FUROSEMIDE 40 MG/1
40 TABLET ORAL DAILY
Qty: 30 TABLET | Refills: 2 | Status: SHIPPED | OUTPATIENT
Start: 2025-04-16

## 2025-05-06 ENCOUNTER — OFFICE VISIT (OUTPATIENT)
Age: 89
End: 2025-05-06
Payer: MEDICARE

## 2025-05-06 VITALS
TEMPERATURE: 98.2 F | OXYGEN SATURATION: 98 % | HEIGHT: 67 IN | HEART RATE: 66 BPM | DIASTOLIC BLOOD PRESSURE: 80 MMHG | BODY MASS INDEX: 23.73 KG/M2 | SYSTOLIC BLOOD PRESSURE: 126 MMHG | WEIGHT: 151.2 LBS

## 2025-05-06 DIAGNOSIS — I10 PRIMARY HYPERTENSION: ICD-10-CM

## 2025-05-06 DIAGNOSIS — G45.9 TIA (TRANSIENT ISCHEMIC ATTACK): Primary | ICD-10-CM

## 2025-05-06 DIAGNOSIS — R56.9 FOCAL SEIZURE (HCC): ICD-10-CM

## 2025-05-06 DIAGNOSIS — I50.22 CHRONIC SYSTOLIC (CONGESTIVE) HEART FAILURE (HCC): ICD-10-CM

## 2025-05-06 PROCEDURE — 99212 OFFICE O/P EST SF 10 MIN: CPT

## 2025-05-06 PROCEDURE — 1123F ACP DISCUSS/DSCN MKR DOCD: CPT

## 2025-05-06 PROCEDURE — G8427 DOCREV CUR MEDS BY ELIG CLIN: HCPCS

## 2025-05-06 PROCEDURE — 99213 OFFICE O/P EST LOW 20 MIN: CPT

## 2025-05-06 PROCEDURE — 4004F PT TOBACCO SCREEN RCVD TLK: CPT

## 2025-05-06 PROCEDURE — G8420 CALC BMI NORM PARAMETERS: HCPCS

## 2025-05-06 RX ORDER — DAPAGLIFLOZIN 5 MG/1
5 TABLET, FILM COATED ORAL EVERY MORNING
Qty: 90 TABLET | Refills: 0 | Status: SHIPPED | OUTPATIENT
Start: 2025-05-06 | End: 2025-05-16 | Stop reason: SDUPTHER

## 2025-05-06 RX ORDER — LOSARTAN POTASSIUM 25 MG/1
25 TABLET ORAL DAILY
Qty: 90 TABLET | Refills: 3 | Status: SHIPPED | OUTPATIENT
Start: 2025-05-06 | End: 2025-05-16 | Stop reason: SDUPTHER

## 2025-05-06 SDOH — ECONOMIC STABILITY: FOOD INSECURITY: WITHIN THE PAST 12 MONTHS, YOU WORRIED THAT YOUR FOOD WOULD RUN OUT BEFORE YOU GOT MONEY TO BUY MORE.: NEVER TRUE

## 2025-05-06 SDOH — ECONOMIC STABILITY: FOOD INSECURITY: WITHIN THE PAST 12 MONTHS, THE FOOD YOU BOUGHT JUST DIDN'T LAST AND YOU DIDN'T HAVE MONEY TO GET MORE.: NEVER TRUE

## 2025-05-06 ASSESSMENT — PATIENT HEALTH QUESTIONNAIRE - PHQ9
4. FEELING TIRED OR HAVING LITTLE ENERGY: NOT AT ALL
9. THOUGHTS THAT YOU WOULD BE BETTER OFF DEAD, OR OF HURTING YOURSELF: NOT AT ALL
7. TROUBLE CONCENTRATING ON THINGS, SUCH AS READING THE NEWSPAPER OR WATCHING TELEVISION: NOT AT ALL
2. FEELING DOWN, DEPRESSED OR HOPELESS: NOT AT ALL
SUM OF ALL RESPONSES TO PHQ QUESTIONS 1-9: 1
1. LITTLE INTEREST OR PLEASURE IN DOING THINGS: NOT AT ALL
10. IF YOU CHECKED OFF ANY PROBLEMS, HOW DIFFICULT HAVE THESE PROBLEMS MADE IT FOR YOU TO DO YOUR WORK, TAKE CARE OF THINGS AT HOME, OR GET ALONG WITH OTHER PEOPLE: NOT DIFFICULT AT ALL
6. FEELING BAD ABOUT YOURSELF - OR THAT YOU ARE A FAILURE OR HAVE LET YOURSELF OR YOUR FAMILY DOWN: NOT AT ALL
SUM OF ALL RESPONSES TO PHQ QUESTIONS 1-9: 1
SUM OF ALL RESPONSES TO PHQ QUESTIONS 1-9: 1
3. TROUBLE FALLING OR STAYING ASLEEP: NOT AT ALL
SUM OF ALL RESPONSES TO PHQ QUESTIONS 1-9: 1
8. MOVING OR SPEAKING SO SLOWLY THAT OTHER PEOPLE COULD HAVE NOTICED. OR THE OPPOSITE, BEING SO FIGETY OR RESTLESS THAT YOU HAVE BEEN MOVING AROUND A LOT MORE THAN USUAL: SEVERAL DAYS
5. POOR APPETITE OR OVEREATING: NOT AT ALL

## 2025-05-16 ENCOUNTER — OFFICE VISIT (OUTPATIENT)
Age: 89
End: 2025-05-16
Payer: MEDICARE

## 2025-05-16 ENCOUNTER — HOSPITAL ENCOUNTER (OUTPATIENT)
Age: 89
Setting detail: SPECIMEN
Discharge: HOME | End: 2025-05-16

## 2025-05-16 VITALS
HEART RATE: 68 BPM | OXYGEN SATURATION: 99 % | HEIGHT: 65 IN | SYSTOLIC BLOOD PRESSURE: 150 MMHG | DIASTOLIC BLOOD PRESSURE: 80 MMHG | BODY MASS INDEX: 24.93 KG/M2 | WEIGHT: 149.6 LBS

## 2025-05-16 DIAGNOSIS — Z00.00 MEDICARE ANNUAL WELLNESS VISIT, INITIAL: ICD-10-CM

## 2025-05-16 DIAGNOSIS — I50.22 CHRONIC SYSTOLIC (CONGESTIVE) HEART FAILURE (HCC): ICD-10-CM

## 2025-05-16 DIAGNOSIS — G45.9 TIA (TRANSIENT ISCHEMIC ATTACK): ICD-10-CM

## 2025-05-16 DIAGNOSIS — Z00.00 INITIAL MEDICARE ANNUAL WELLNESS VISIT: Primary | ICD-10-CM

## 2025-05-16 DIAGNOSIS — R60.0 BILATERAL LEG EDEMA: ICD-10-CM

## 2025-05-16 DIAGNOSIS — I10 PRIMARY HYPERTENSION: ICD-10-CM

## 2025-05-16 DIAGNOSIS — R56.9 FOCAL SEIZURE (HCC): ICD-10-CM

## 2025-05-16 DIAGNOSIS — N40.0 BENIGN PROSTATIC HYPERPLASIA WITHOUT LOWER URINARY TRACT SYMPTOMS: ICD-10-CM

## 2025-05-16 LAB
ALBUMIN SERPL-MCNC: 3.8 G/DL (ref 3.5–5.2)
ALBUMIN/GLOB SERPL: 1.3 {RATIO} (ref 1–2.5)
ALP SERPL-CCNC: 99 U/L (ref 40–129)
ALT SERPL-CCNC: 12 U/L (ref 10–50)
ANION GAP SERPL CALCULATED.3IONS-SCNC: 9 MMOL/L (ref 9–16)
AST SERPL-CCNC: 19 U/L (ref 10–50)
BASOPHILS # BLD: 0.03 K/UL (ref 0–0.2)
BASOPHILS NFR BLD: 1 % (ref 0–2)
BILIRUB SERPL-MCNC: 0.3 MG/DL (ref 0–1.2)
BUN SERPL-MCNC: 21 MG/DL (ref 8–23)
CALCIUM SERPL-MCNC: 9.2 MG/DL (ref 8.6–10.4)
CHLORIDE SERPL-SCNC: 106 MMOL/L (ref 98–107)
CO2 SERPL-SCNC: 27 MMOL/L (ref 20–31)
CREAT SERPL-MCNC: 1.4 MG/DL (ref 0.7–1.2)
EOSINOPHIL # BLD: 0.14 K/UL (ref 0–0.44)
EOSINOPHILS RELATIVE PERCENT: 2 % (ref 1–4)
ERYTHROCYTE [DISTWIDTH] IN BLOOD BY AUTOMATED COUNT: 14 % (ref 11.8–14.4)
GFR, ESTIMATED: 47 ML/MIN/1.73M2
GLUCOSE SERPL-MCNC: 85 MG/DL (ref 74–99)
HCT VFR BLD AUTO: 33.2 % (ref 40.7–50.3)
HGB BLD-MCNC: 10.3 G/DL (ref 13–17)
IMM GRANULOCYTES # BLD AUTO: <0.03 K/UL (ref 0–0.3)
IMM GRANULOCYTES NFR BLD: 0 %
LYMPHOCYTES NFR BLD: 1.91 K/UL (ref 1.1–3.7)
LYMPHOCYTES RELATIVE PERCENT: 31 % (ref 24–43)
MCH RBC QN AUTO: 28.6 PG (ref 25.2–33.5)
MCHC RBC AUTO-ENTMCNC: 31 G/DL (ref 28.4–34.8)
MCV RBC AUTO: 92.2 FL (ref 82.6–102.9)
MONOCYTES NFR BLD: 0.52 K/UL (ref 0.1–1.2)
MONOCYTES NFR BLD: 8 % (ref 3–12)
NEUTROPHILS NFR BLD: 58 % (ref 36–65)
NEUTS SEG NFR BLD: 3.63 K/UL (ref 1.5–8.1)
NRBC BLD-RTO: 0 PER 100 WBC
PLATELET # BLD AUTO: 231 K/UL (ref 138–453)
PMV BLD AUTO: 12.2 FL (ref 8.1–13.5)
POTASSIUM SERPL-SCNC: 4.2 MMOL/L (ref 3.7–5.3)
PROT SERPL-MCNC: 6.7 G/DL (ref 6.6–8.7)
RBC # BLD AUTO: 3.6 M/UL (ref 4.21–5.77)
SODIUM SERPL-SCNC: 142 MMOL/L (ref 136–145)
WBC OTHER # BLD: 6.2 K/UL (ref 3.5–11.3)

## 2025-05-16 PROCEDURE — 99212 OFFICE O/P EST SF 10 MIN: CPT

## 2025-05-16 PROCEDURE — G0438 PPPS, INITIAL VISIT: HCPCS

## 2025-05-16 PROCEDURE — 99213 OFFICE O/P EST LOW 20 MIN: CPT

## 2025-05-16 PROCEDURE — 1123F ACP DISCUSS/DSCN MKR DOCD: CPT

## 2025-05-16 PROCEDURE — 1159F MED LIST DOCD IN RCRD: CPT

## 2025-05-16 RX ORDER — DAPAGLIFLOZIN 5 MG/1
5 TABLET, FILM COATED ORAL EVERY MORNING
Qty: 90 TABLET | Refills: 1 | Status: SHIPPED | OUTPATIENT
Start: 2025-05-16

## 2025-05-16 RX ORDER — FUROSEMIDE 40 MG/1
40 TABLET ORAL DAILY
Qty: 90 TABLET | Refills: 1 | Status: SHIPPED | OUTPATIENT
Start: 2025-05-16

## 2025-05-16 RX ORDER — TAMSULOSIN HYDROCHLORIDE 0.4 MG/1
0.4 CAPSULE ORAL DAILY
Qty: 90 CAPSULE | Refills: 1 | Status: SHIPPED | OUTPATIENT
Start: 2025-05-16

## 2025-05-16 RX ORDER — LOSARTAN POTASSIUM 25 MG/1
25 TABLET ORAL DAILY
Qty: 90 TABLET | Refills: 1 | Status: SHIPPED | OUTPATIENT
Start: 2025-05-16

## 2025-05-16 RX ORDER — SPIRONOLACTONE 25 MG/1
12.5 TABLET ORAL DAILY
Qty: 45 TABLET | Refills: 1 | Status: SHIPPED | OUTPATIENT
Start: 2025-05-16

## 2025-05-16 ASSESSMENT — PATIENT HEALTH QUESTIONNAIRE - PHQ9
1. LITTLE INTEREST OR PLEASURE IN DOING THINGS: NOT AT ALL
2. FEELING DOWN, DEPRESSED OR HOPELESS: NOT AT ALL
SUM OF ALL RESPONSES TO PHQ QUESTIONS 1-9: 0

## 2025-05-16 ASSESSMENT — LIFESTYLE VARIABLES
HOW OFTEN DO YOU HAVE A DRINK CONTAINING ALCOHOL: NEVER
HOW MANY STANDARD DRINKS CONTAINING ALCOHOL DO YOU HAVE ON A TYPICAL DAY: PATIENT DOES NOT DRINK

## 2025-05-16 NOTE — PROGRESS NOTES
Medicare Annual Wellness Visit    Shukri Stephenson Sr. is here for Medicare AWV (First AWV), Hypertension (3 month f/u), and Fall (Fell a couple weeks ago, having residual right arm pain)    Assessment & Plan   Chronic systolic (congestive) heart failure  The following orders have not been finalized:  -     spironolactone (ALDACTONE) 25 MG tablet  -     FARXIGA 5 MG tablet  Primary hypertension  The following orders have not been finalized:  -     furosemide (LASIX) 40 MG tablet  -     losartan (COZAAR) 25 MG tablet  Bilateral leg edema  The following orders have not been finalized:  -     furosemide (LASIX) 40 MG tablet  Benign prostatic hyperplasia without lower urinary tract symptoms  The following orders have not been finalized:  -     tamsulosin (FLOMAX) 0.4 MG capsule       No follow-ups on file.     Subjective     Patient's complete Health Risk Assessment and screening values have been reviewed and are found in Flowsheets. The following problems were reviewed today and where indicated follow up appointments were made and/or referrals ordered.    Positive Risk Factor Screenings with Interventions:    Fall Risk:  Do you feel unsteady or are you worried about falling? : no  2 or more falls in past year?: (!) yes (one fall with injury, will discuss with PCP)  Fall with injury in past year?: (!) yes (residual pain in right arm, will discuss with PCP)     Interventions:    Reviewed medications, home hazards, visual acuity, and co-morbidities that can increase risk for falls  Patient declines any further evaluation or treatment            General HRA Questions:  Select all that apply: (!) New or Increased Pain (right arm pain residual from fall)    Interventions - Pain:  See above  Will discuss with PCP if imaging needed           Vision Screen:  Do you have difficulty driving, watching TV, or doing any of your daily activities because of your eyesight?: No  Have you had an eye exam within the past year?: (!) No (will

## 2025-05-16 NOTE — PROGRESS NOTES
Attending Physician Statement  I have discussed the care of Shukri Stephenson Sr., including pertinent history and exam findings with the resident. I have reviewed the key elements of all parts of the encounter with the resident. I agree with the assessment, and status of the problem list as documented. The plan and orders should include   Orders Placed This Encounter   Procedures    CT HEAD WO CONTRAST    CBC with Auto Differential    Comprehensive Metabolic Panel    and this was also documented by the resident. The medication list was reviewed with the resident and is up to date. The return visit should be in 4 weeks .    Robert Jefferson MD  Attending Physician,  Department of Internal Medicine  [facility] Internal Medicine  [facility]      5/16/2025, 11:08 AM

## 2025-05-16 NOTE — PROGRESS NOTES
Medicare Annual Wellness Visit    Shukri Stephenson  is here for Medicare AWV (First AWV), Hypertension (3 month f/u, pt did not take medication this morning d/t having appt - pt takes diuretic and avoids it when he has to go out. Pt also did not receive losartan from last appt, medication sent to wrong pharmacy), and Fall (Fell a couple weeks ago, having residual right arm pain)    Assessment & Plan   Medicare annual wellness visit, initial  Chronic systolic (congestive) heart failure  -     FARXIGA 5 MG tablet; Take 1 tablet by mouth every morning, Disp-90 tablet, R-1, DAWNormal  -     spironolactone (ALDACTONE) 25 MG tablet; Take 0.5 tablets by mouth daily, Disp-45 tablet, R-1Normal  -     CBC with Auto Differential; Future  -     Comprehensive Metabolic Panel; Future  Primary hypertension  -     losartan (COZAAR) 25 MG tablet; Take 1 tablet by mouth daily, Disp-90 tablet, R-1Normal  -     furosemide (LASIX) 40 MG tablet; Take 1 tablet by mouth daily, Disp-90 tablet, R-1Normal  -     CBC with Auto Differential; Future  -     Comprehensive Metabolic Panel; Future  Bilateral leg edema  -     furosemide (LASIX) 40 MG tablet; Take 1 tablet by mouth daily, Disp-90 tablet, R-1Normal  Benign prostatic hyperplasia without lower urinary tract symptoms  -     tamsulosin (FLOMAX) 0.4 MG capsule; Take 1 capsule by mouth daily, Disp-90 capsule, R-1Normal  TIA (transient ischemic attack)  -     CT HEAD WO CONTRAST; Future  Focal seizure (HCC)  -     CT HEAD WO CONTRAST; Future       Patient still complaining of right arm pain, which has been improving.  Advised to continue Tylenol and warm compresses.  Patient was accompanied by his son during the visit, stressed importance of following up with neurology to rule out any seizure.  Will get a CT head to rule out any acute abnormality.      Echo in 2023 showed HFrEF and severe aortic stenosis, patient currently refuses any invasive treatment for aortic stenosis.  He follows up

## 2025-05-23 ENCOUNTER — RESULTS FOLLOW-UP (OUTPATIENT)
Dept: INTERNAL MEDICINE CLINIC | Age: 89
End: 2025-05-23

## 2025-05-23 DIAGNOSIS — D64.9 NORMOCYTIC ANEMIA: Primary | ICD-10-CM

## 2025-07-25 DIAGNOSIS — I50.22 CHRONIC SYSTOLIC (CONGESTIVE) HEART FAILURE (HCC): ICD-10-CM

## 2025-07-25 NOTE — TELEPHONE ENCOUNTER
Request for   Requested Prescriptions      No prescriptions requested or ordered in this encounter    .      Please review and e-scribe to pharmacy listed in chart if appropriate. Thank you.      Last Visit Date: 5/16/2025  Next Visit Date: 8/27/2025    Future Appointments   Date Time Provider Department Center   8/27/2025 10:45 AM Lilia Seymour MD [facility] DEP       Health Maintenance   Topic Date Due    Pneumococcal 50+ years Vaccine (1 of 2 - PCV) Never done    Shingles vaccine (1 of 2) Never done    Polio vaccine (2 of 3 - Adult catch-up series) 06/13/2001    Hepatitis B vaccine (3 of 3 - 19+ 3-dose series) 10/18/2002    Respiratory Syncytial Virus (RSV) Pregnant or age 60 yrs+ (1 - 1-dose 75+ series) Never done    COVID-19 Vaccine (5 - 2024-25 season) 09/01/2024    Flu vaccine (1) 08/01/2025    Depression Screen  05/16/2026    Annual Wellness Visit (Medicare)  05/17/2026    DTaP/Tdap/Td vaccine (4 - Td or Tdap) 01/30/2033    Hepatitis A vaccine  Aged Out    Hib vaccine  Aged Out    Meningococcal (ACWY) vaccine  Aged Out    Meningococcal B vaccine  Aged Out       No results found for: \"LABA1C\"          ( goal A1C is < 7)   No components found for: \"LABMICR\"  No components found for: \"LDLCHOLESTEROL\", \"LDLCALC\"    (goal LDL is <100)   AST (U/L)   Date Value   05/16/2025 19     ALT (U/L)   Date Value   05/16/2025 12     BUN (mg/dL)   Date Value   05/16/2025 21     BP Readings from Last 3 Encounters:   05/16/25 (!) 150/80   05/06/25 126/80   02/14/25 (!) 146/56          (goal 120/80)    All Future Testing planned in [facility]  Lab Frequency Next Occurrence   EEG awake and asleep Once 09/09/2024   CT HEAD WO CONTRAST Once 05/16/2025   Ferritin Once 05/23/2025   Iron and TIBC Once 05/23/2025         Patient Active Problem List:     Bilateral carotid artery stenosis     BPH (benign prostatic hyperplasia)     Primary hypertension     Primary open angle glaucoma (POAG)     Peripheral artery disease

## 2025-07-29 RX ORDER — SPIRONOLACTONE 25 MG/1
12.5 TABLET ORAL DAILY
Qty: 45 TABLET | Refills: 1 | Status: SHIPPED | OUTPATIENT
Start: 2025-07-29